# Patient Record
Sex: FEMALE | Race: WHITE | NOT HISPANIC OR LATINO | Employment: FULL TIME | URBAN - METROPOLITAN AREA
[De-identification: names, ages, dates, MRNs, and addresses within clinical notes are randomized per-mention and may not be internally consistent; named-entity substitution may affect disease eponyms.]

---

## 2023-07-14 PROBLEM — H90.6 MIXED HEARING LOSS, BILATERAL: Status: ACTIVE | Noted: 2023-07-14

## 2023-07-14 PROBLEM — J01.41 ACUTE RECURRENT PANSINUSITIS: Status: ACTIVE | Noted: 2023-07-14

## 2023-07-14 PROBLEM — I10 HYPERTENSION: Status: ACTIVE | Noted: 2023-07-14

## 2023-07-14 PROBLEM — J45.909 ASTHMA (HHS-HCC): Status: ACTIVE | Noted: 2022-10-10

## 2023-07-14 PROBLEM — J34.3 HYPERTROPHY OF INFERIOR NASAL TURBINATE: Status: ACTIVE | Noted: 2023-07-14

## 2023-07-14 PROBLEM — H92.02 LEFT EAR PAIN: Status: ACTIVE | Noted: 2023-07-14

## 2023-07-14 PROBLEM — R79.89 ABNORMAL TSH: Status: ACTIVE | Noted: 2023-07-14

## 2023-07-14 PROBLEM — R30.0 DYSURIA: Status: ACTIVE | Noted: 2023-07-14

## 2023-07-14 PROBLEM — D72.819 LEUKOPENIA: Status: ACTIVE | Noted: 2023-07-14

## 2023-07-14 PROBLEM — M19.032 LOCALIZED PRIMARY OSTEOARTHRITIS OF CARPOMETACARPAL (CMC) JOINT OF LEFT WRIST: Status: ACTIVE | Noted: 2023-07-14

## 2023-07-14 PROBLEM — M25.562 LEFT KNEE PAIN: Status: ACTIVE | Noted: 2018-06-20

## 2023-07-14 PROBLEM — N39.0 ACUTE LOWER UTI: Status: ACTIVE | Noted: 2023-07-14

## 2023-07-14 PROBLEM — M25.532 LEFT WRIST PAIN: Status: ACTIVE | Noted: 2023-07-14

## 2023-07-14 PROBLEM — H93.293 ABNORMAL AUDITORY PERCEPTION OF BOTH EARS: Status: ACTIVE | Noted: 2023-07-14

## 2023-07-14 RX ORDER — ACETAMINOPHEN 500 MG
TABLET ORAL
COMMUNITY
Start: 2022-10-28

## 2023-07-14 RX ORDER — VENLAFAXINE HYDROCHLORIDE 225 MG/1
1 TABLET, EXTENDED RELEASE ORAL DAILY
COMMUNITY
Start: 2019-09-24 | End: 2023-07-17 | Stop reason: SDUPTHER

## 2023-07-14 RX ORDER — METHOTREXATE 2.5 MG/1
20 TABLET ORAL WEEKLY
COMMUNITY
Start: 2023-06-01

## 2023-07-14 RX ORDER — FOLIC ACID 1 MG/1
1 TABLET ORAL
COMMUNITY
Start: 2023-06-01

## 2023-07-14 RX ORDER — BUDESONIDE, MICRONIZED 100 %
POWDER (GRAM) MISCELLANEOUS
COMMUNITY
Start: 2022-09-07 | End: 2023-07-17 | Stop reason: ALTCHOICE

## 2023-07-14 RX ORDER — SALINE NASAL SPRAY 1.5 OZ
SOLUTION NASAL
COMMUNITY
Start: 2022-08-30 | End: 2023-07-17 | Stop reason: ALTCHOICE

## 2023-07-14 RX ORDER — VENLAFAXINE 100 MG/1
225 TABLET ORAL
COMMUNITY
End: 2023-07-17 | Stop reason: ALTCHOICE

## 2023-07-14 RX ORDER — FLUTICASONE FUROATE 27.5 UG/1
SPRAY, METERED NASAL
COMMUNITY
Start: 2022-02-16 | End: 2023-10-30 | Stop reason: ALTCHOICE

## 2023-07-14 RX ORDER — VERAPAMIL HYDROCHLORIDE 180 MG/1
180 TABLET, FILM COATED, EXTENDED RELEASE ORAL
COMMUNITY
End: 2023-07-17 | Stop reason: SDUPTHER

## 2023-07-14 RX ORDER — POLYETHYLENE GLYCOL 3350 17 G/17G
POWDER, FOR SOLUTION ORAL
COMMUNITY
Start: 2022-05-05 | End: 2023-07-17 | Stop reason: ALTCHOICE

## 2023-07-14 RX ORDER — VERAPAMIL HYDROCHLORIDE 40 MG/1
40 TABLET ORAL
COMMUNITY
End: 2023-07-17 | Stop reason: WASHOUT

## 2023-07-14 RX ORDER — OXYMETAZOLINE HCL 0.05 %
SPRAY, NON-AEROSOL (ML) NASAL
COMMUNITY
Start: 2022-08-30 | End: 2023-07-17 | Stop reason: ALTCHOICE

## 2023-07-14 RX ORDER — ALENDRONATE SODIUM 70 MG/1
TABLET ORAL
COMMUNITY
Start: 2021-09-29

## 2023-07-14 RX ORDER — ESTRADIOL 0.1 MG/G
CREAM VAGINAL
COMMUNITY
Start: 2022-02-24 | End: 2023-07-17 | Stop reason: ALTCHOICE

## 2023-07-14 RX ORDER — LISINOPRIL 30 MG/1
30 TABLET ORAL
COMMUNITY
End: 2023-07-17 | Stop reason: SDUPTHER

## 2023-07-14 RX ORDER — AZELASTINE 1 MG/ML
SPRAY, METERED NASAL 2 TIMES DAILY
COMMUNITY
Start: 2022-03-31 | End: 2023-07-17 | Stop reason: ALTCHOICE

## 2023-07-14 RX ORDER — FLUTICASONE PROPIONATE 93 UG/1
SPRAY, METERED NASAL
COMMUNITY
Start: 2022-05-05 | End: 2023-07-17 | Stop reason: ALTCHOICE

## 2023-07-14 RX ORDER — SENNOSIDES 8.6 MG/1
TABLET ORAL
COMMUNITY
Start: 2022-08-30 | End: 2023-07-17 | Stop reason: ALTCHOICE

## 2023-07-14 RX ORDER — ACETAMINOPHEN 500 MG
2 TABLET ORAL EVERY 8 HOURS
COMMUNITY
Start: 2022-08-30

## 2023-07-14 RX ORDER — NAPROXEN 500 MG/1
1 TABLET ORAL
COMMUNITY
Start: 2022-05-06 | End: 2023-07-17 | Stop reason: ALTCHOICE

## 2023-07-17 ENCOUNTER — OFFICE VISIT (OUTPATIENT)
Dept: PRIMARY CARE | Facility: CLINIC | Age: 63
End: 2023-07-17
Payer: COMMERCIAL

## 2023-07-17 VITALS
HEART RATE: 73 BPM | HEIGHT: 60 IN | OXYGEN SATURATION: 96 % | BODY MASS INDEX: 29.53 KG/M2 | DIASTOLIC BLOOD PRESSURE: 80 MMHG | WEIGHT: 150.4 LBS | SYSTOLIC BLOOD PRESSURE: 144 MMHG

## 2023-07-17 DIAGNOSIS — J31.0 CHRONIC RHINITIS: ICD-10-CM

## 2023-07-17 DIAGNOSIS — R79.89 ABNORMAL TSH: ICD-10-CM

## 2023-07-17 DIAGNOSIS — M06.9 RHEUMATOID ARTHRITIS, INVOLVING UNSPECIFIED SITE, UNSPECIFIED WHETHER RHEUMATOID FACTOR PRESENT (MULTI): ICD-10-CM

## 2023-07-17 DIAGNOSIS — Z12.31 ENCOUNTER FOR SCREENING MAMMOGRAM FOR MALIGNANT NEOPLASM OF BREAST: ICD-10-CM

## 2023-07-17 DIAGNOSIS — Z13.220 SCREENING, LIPID: ICD-10-CM

## 2023-07-17 DIAGNOSIS — R73.03 PREDIABETES: ICD-10-CM

## 2023-07-17 DIAGNOSIS — E55.9 VITAMIN D DEFICIENCY: ICD-10-CM

## 2023-07-17 DIAGNOSIS — I10 HYPERTENSION, UNSPECIFIED TYPE: ICD-10-CM

## 2023-07-17 DIAGNOSIS — J45.909 ASTHMA, UNSPECIFIED ASTHMA SEVERITY, UNSPECIFIED WHETHER COMPLICATED, UNSPECIFIED WHETHER PERSISTENT (HHS-HCC): ICD-10-CM

## 2023-07-17 DIAGNOSIS — F32.A DEPRESSION, UNSPECIFIED DEPRESSION TYPE: ICD-10-CM

## 2023-07-17 DIAGNOSIS — Z00.00 WELLNESS EXAMINATION: Primary | ICD-10-CM

## 2023-07-17 DIAGNOSIS — M85.80 OSTEOPENIA, UNSPECIFIED LOCATION: ICD-10-CM

## 2023-07-17 PROBLEM — E66.811 CLASS 1 OBESITY: Status: ACTIVE | Noted: 2023-07-17

## 2023-07-17 PROBLEM — J01.90 ACUTE SINUSITIS: Status: ACTIVE | Noted: 2022-08-30

## 2023-07-17 PROBLEM — R82.90 FOUL SMELLING URINE: Status: ACTIVE | Noted: 2023-07-17

## 2023-07-17 PROBLEM — E66.9 CLASS 1 OBESITY: Status: ACTIVE | Noted: 2023-07-17

## 2023-07-17 PROBLEM — L82.1 SEBORRHEIC KERATOSIS: Status: ACTIVE | Noted: 2023-07-17

## 2023-07-17 PROBLEM — R19.7 DIARRHEA: Status: ACTIVE | Noted: 2023-07-17

## 2023-07-17 PROBLEM — E66.3 OVERWEIGHT WITH BODY MASS INDEX (BMI) 25.0-29.9: Status: ACTIVE | Noted: 2023-07-17

## 2023-07-17 PROBLEM — M25.50 ARTHRALGIA OF MULTIPLE JOINTS: Status: ACTIVE | Noted: 2023-07-17

## 2023-07-17 PROBLEM — E78.5 HYPERLIPIDEMIA: Status: ACTIVE | Noted: 2022-06-15

## 2023-07-17 PROBLEM — M77.8 TENDINITIS OF HAND: Status: ACTIVE | Noted: 2023-07-17

## 2023-07-17 PROBLEM — H61.20 IMPACTED CERUMEN: Status: ACTIVE | Noted: 2023-07-17

## 2023-07-17 PROBLEM — N39.0 URINARY TRACT INFECTION: Status: ACTIVE | Noted: 2023-07-17

## 2023-07-17 PROBLEM — M15.4 EROSIVE OSTEOARTHRITIS: Status: ACTIVE | Noted: 2022-05-09

## 2023-07-17 PROBLEM — L23.7 CONTACT DERMATITIS DUE TO POISON IVY: Status: ACTIVE | Noted: 2022-06-15

## 2023-07-17 PROBLEM — J34.89 NASAL CRUSTING: Status: ACTIVE | Noted: 2023-07-17

## 2023-07-17 PROBLEM — H60.509 ACUTE OTITIS EXTERNA: Status: ACTIVE | Noted: 2023-07-17

## 2023-07-17 PROBLEM — R53.83 FATIGUE: Status: ACTIVE | Noted: 2023-07-17

## 2023-07-17 PROBLEM — R10.9 FLANK PAIN: Status: ACTIVE | Noted: 2023-07-17

## 2023-07-17 PROBLEM — H74.01 TYMPANOSCLEROSIS OF RIGHT EAR: Status: ACTIVE | Noted: 2023-07-17

## 2023-07-17 PROBLEM — J34.89 NASAL DISCHARGE: Status: ACTIVE | Noted: 2023-07-17

## 2023-07-17 PROBLEM — J34.2 DEVIATED NASAL SEPTUM: Status: ACTIVE | Noted: 2022-08-30

## 2023-07-17 PROCEDURE — 93000 ELECTROCARDIOGRAM COMPLETE: CPT | Performed by: NURSE PRACTITIONER

## 2023-07-17 PROCEDURE — 99396 PREV VISIT EST AGE 40-64: CPT | Performed by: NURSE PRACTITIONER

## 2023-07-17 PROCEDURE — 3079F DIAST BP 80-89 MM HG: CPT | Performed by: NURSE PRACTITIONER

## 2023-07-17 PROCEDURE — 3077F SYST BP >= 140 MM HG: CPT | Performed by: NURSE PRACTITIONER

## 2023-07-17 PROCEDURE — 1036F TOBACCO NON-USER: CPT | Performed by: NURSE PRACTITIONER

## 2023-07-17 RX ORDER — LISINOPRIL 30 MG/1
30 TABLET ORAL
Qty: 90 TABLET | Refills: 1 | Status: SHIPPED | OUTPATIENT
Start: 2023-07-17

## 2023-07-17 RX ORDER — NEOMYCIN SULFATE, POLYMYXIN B SULFATE AND HYDROCORTISONE 10; 3.5; 1 MG/ML; MG/ML; [USP'U]/ML
SUSPENSION/ DROPS AURICULAR (OTIC)
COMMUNITY
Start: 2023-06-11 | End: 2023-07-17 | Stop reason: ALTCHOICE

## 2023-07-17 RX ORDER — VENLAFAXINE HYDROCHLORIDE 225 MG/1
225 TABLET, EXTENDED RELEASE ORAL DAILY
Qty: 90 TABLET | Refills: 1 | Status: SHIPPED | OUTPATIENT
Start: 2023-07-17

## 2023-07-17 RX ORDER — VERAPAMIL HYDROCHLORIDE 180 MG/1
180 TABLET, FILM COATED, EXTENDED RELEASE ORAL
Qty: 90 TABLET | Refills: 1 | Status: SHIPPED | OUTPATIENT
Start: 2023-07-17

## 2023-07-17 ASSESSMENT — ENCOUNTER SYMPTOMS
RESPIRATORY NEGATIVE: 1
NEUROLOGICAL NEGATIVE: 1
GASTROINTESTINAL NEGATIVE: 1
CONSTITUTIONAL NEGATIVE: 1
CARDIOVASCULAR NEGATIVE: 1
PSYCHIATRIC NEGATIVE: 1

## 2023-07-17 NOTE — PATIENT INSTRUCTIONS
Labs are fasting 10-12 hours, do drink water, complete prior to next appt  Mammogram has been ordered, please schedule to complete  Monitor BP at home and let me know if continues to in the 130's/ 90's

## 2023-07-17 NOTE — PROGRESS NOTES
Subjective   Patient ID: Margarito Dumont is a 62 y.o. female who presents for Annual Exam (Yearly well exam ).    HPI   Patient here for yearly wellness exam, last seen on 10/17/2022  25,000 steps a day as a .  Current concerns: None   Chronic concerns: Depression,  HTN, Hyperlipidemia, RA, Prediabetes, Vitamin D deficiency, Chronic rhinitis, Depression & anxiety, asthma, osteopenia   Specialist   - Dermatologist  - Allergist  - Otolaryngology  - Rheumatologists  - Urogyn - Dr. Vignesh Flynn rheumatology 06/2023    Review of Systems   Constitutional: Negative.    HENT: Negative.     Respiratory: Negative.     Cardiovascular: Negative.    Gastrointestinal: Negative.    Genitourinary: Negative.    Musculoskeletal:         As noted in HPI   Skin: Negative.    Neurological: Negative.    Psychiatric/Behavioral: Negative.         Objective   /80 (BP Location: Left arm, Patient Position: Sitting, BP Cuff Size: Adult)   Pulse 73   Ht 1.524 m (5')   Wt 68.2 kg (150 lb 6.4 oz)   SpO2 96%   BMI 29.37 kg/m²     Physical Exam  Vitals reviewed.   Constitutional:       Appearance: Normal appearance.   HENT:      Right Ear: Tympanic membrane, ear canal and external ear normal.      Left Ear: Tympanic membrane, ear canal and external ear normal.      Nose: Nose normal.      Mouth/Throat:      Mouth: Mucous membranes are moist.      Pharynx: Oropharynx is clear.   Eyes:      General: Lids are normal.      Conjunctiva/sclera: Conjunctivae normal.   Neck:      Thyroid: No thyromegaly.      Vascular: No carotid bruit.   Cardiovascular:      Rate and Rhythm: Normal rate and regular rhythm.      Heart sounds: Normal heart sounds.   Pulmonary:      Effort: Pulmonary effort is normal.      Breath sounds: Normal breath sounds.   Abdominal:      General: Bowel sounds are normal.      Palpations: Abdomen is soft.   Musculoskeletal:         General: Normal range of motion.      Cervical back: Normal range of motion and neck  supple.   Lymphadenopathy:      Cervical: No cervical adenopathy.   Skin:     General: Skin is warm.   Neurological:      General: No focal deficit present.      Mental Status: She is alert.   Psychiatric:         Mood and Affect: Mood normal.         Behavior: Behavior normal.         Judgment: Judgment normal.       Assessment/Plan   Health Maintenance  Labs- latest through rheumatology   Influenza- declined   Prevnar 13/20- not indicated   Shingrix- recommended previously has yet to receive.   Colonoscopy 06/2022- follow up 5 years  Cervical Cancer screen - Hysterectomy   Mammogram- 04/2022- order placed to update screening.   DEXA BONE Density through Rheumatology   Diagnoses and all orders for this visit:    Wellness examination  -     CBC and Auto Differential; Future  -     Comprehensive Metabolic Panel; Future  Hypertension, unspecified type- Bp elevated today on repeat measurement  132/92,   Continue to monitor at home and call back to office if remains elevated 130/90.   -     lisinopril 30 mg tablet; Take 1 tablet (30 mg) by mouth once daily.  -     verapamil SR (Calan SR) 180 mg ER tablet; Take 1 tablet (180 mg) by mouth once daily.  -     ECG 12 lead (Clinic Performed)- Normal EKG.   -     CBC and Auto Differential; Future  -     Comprehensive Metabolic Panel; Future  -     Albumin , Urine Random; Future  Asthma, unspecified asthma severity, unspecified whether complicated, unspecified whether persistent  Depression, unspecified depression type  -     venlafaxine 225 mg 24 hr tablet; Take 1 tablet (225 mg) by mouth once daily. (Refilled 90 +1)  Osteopenia, unspecified location  -     Vitamin D 1,25 Dihydroxy; Future (over the counter)   Rheumatoid arthritis, involving unspecified site, unspecified whether rheumatoid factor present (CMS/[facility])-   Medications managed by rheumatologist  - Alendronate - Folic acid- Methotrexate  Abnormal TSH  -     TSH with reflex to Free T4 if abnormal;  Future  Prediabetes  -     Hemoglobin A1C; Future  Vitamin D deficiency  -     Vitamin D 1,25 Dihydroxy; Future  Chronic rhinitis- Sees ENT  Encounter for screening mammogram for malignant neoplasm of breast  -     BI mammo bilateral screening tomosynthesis; Future  Screening, lipid  -    Lipid Panel; Future  Plan- Six months   Labs, complete prior to next appt  Mammogram order-> call with result  Monitor BP at home and let me know if continues to in the 130's/ 90's

## 2023-08-19 PROBLEM — J32.4 CHRONIC PANSINUSITIS: Status: ACTIVE | Noted: 2023-08-19

## 2023-08-19 PROBLEM — M77.8 TENDINITIS OF HAND: Status: RESOLVED | Noted: 2023-07-17 | Resolved: 2023-08-19

## 2023-08-19 PROBLEM — E78.00 ELEVATED LDL CHOLESTEROL LEVEL: Status: ACTIVE | Noted: 2023-08-19

## 2023-08-19 PROBLEM — E78.00 ELEVATED LDL CHOLESTEROL LEVEL: Status: RESOLVED | Noted: 2023-08-19 | Resolved: 2023-08-19

## 2023-08-19 RX ORDER — FLUTICASONE PROPIONATE 50 MCG
2 SPRAY, SUSPENSION (ML) NASAL
COMMUNITY
Start: 2009-11-05

## 2023-08-19 RX ORDER — AMOXICILLIN AND CLAVULANATE POTASSIUM 875; 125 MG/1; MG/1
TABLET, FILM COATED ORAL
COMMUNITY
Start: 2009-11-16 | End: 2023-10-30 | Stop reason: ALTCHOICE

## 2023-10-26 NOTE — PROGRESS NOTES
Subjective   Patient ID: Margarito Dumont is a 63 y.o. female who presents for No chief complaint on file..  HPI  This 63-year-old female is being seen in the office today for recheck of her head and neck and ears especially.  She does have a history of mixed hearing loss with a significantly sclerotic eardrum on the right.  She has had problems with barotrauma in the past which was treated medically.  She presents today for recheck of her hearing primarily.  No difficulties with barotrauma or recurrence of middle ear fluid over the ear.  She does sense a difference between the hearing on both ears her right is always been her lower hearing side.    Objective   Physical Exam  GENERAL MUNA.EARANCE: Alert, in no acute distress, normal pitch and clarity of voice, well-developed and nourished, cooperative.     HEAD/FACE: Normocephalic, atraumatic, normal facial movements and strength, no no tenderness to palpation, no lesions noted.     SKIN: Normal turgor, no raised or ulcerative lesions, warm and dry to palpation.     EYES: Extraocular motions intact, no nystagmus noted, pupils equal and reactive to light and accommodation, no conjunctivitis.     EARS:  -external ear on the right was negative for inflammation the canal was slightly narrow at its opening. There was some cerumen debris removed with a wax loop microscopically. The ear canal itself was noninflamed and the surface of the tympanic membrane was noninflamed and intact with some sclerotic changes.  There was diffuse sclerosis of the tympanic membrane noted consistent with her past exams. On the left-hand side there was a patent canal with a monomeric tympanic membrane with dryness noted.  Sclerotic changes around the periphery were noted and around the annulus posteriorly there did not appear to be any evidence for middle ear inflammation on the left.     NOSE: No external skin lesions are noted, nares are patent, septum is intact, no evidence for purulent  discharge was noted by anterior rhinoscopy. She status post turbinate reduction. Sinuses are nontender to palpation bilaterally, no intranasal lesions or inflammation is noted, nasal valve is normal.     OROPHARYNX/ORAL CAVITY: Oropharynx is not inflamed and is without lesions, mucosa of the oral cavity is intact and without lesions, tongue is midline and mobile, upper plate, no mucous membrane pathology was noted on the palate or oropharynx, TMJ was mobile     NECK: No lymphadenopathy is palpated, carotid pulses are intact, neck is supple with full range of motion, no thyroid abnormalities are noted, trachea is midline, no neck masses are palpated.        NEUROLOGIC/PSYCH; alert and oriented, cranial nerves are grossly intact, gait is without falling, no motor deficits are noted    Her audiogram today on the right ear continues to show a mild low-frequency hearing loss mild conductive loss at 2000 3000 Hz moderate loss in the remaining upper frequencies of sound.  Left ear relatively the same as the right ear up through 1500 Hz continues to be low normal to mild hearing loss up to 4000 Hz then a mild to moderate high-frequency hearing loss is noted the asymmetry between the 2 ears in the high frequencies remains unchanged compared to 1 year ago.  She continues to have bilateral tinnitus which has not changed.  Tympanograms do not reflect adequate movement on the right due to the sclerosis and thickness of the TM.  There is a monomeric type a pattern on the left.    Patient ID: Margarito Dumont is a 63 y.o. female.    Ear cerumen removal    Date/Time: 10/30/2023 9:24 AM    Performed by: Ortiz Moore DMD, MD  Authorized by: Ortiz Moore DMD, MD    Consent:     Consent obtained:  Verbal    Consent given by:  Patient    Risks discussed:  Pain and incomplete removal    Alternatives discussed:  No treatment and alternative treatment  Universal protocol:     Procedure explained and questions answered to patient  or proxy's satisfaction: yes      Imaging studies available: no      Required blood products, implants, devices, and special equipment available: no      Patient identity confirmed:  Verbally with patient  Procedure details:     Location:  L ear and R ear    Procedure type: curette      Procedure type comment:  Or suction    Procedure outcomes: cerumen removed    Post-procedure details:     Inspection:  No bleeding and ear canal clear    Hearing quality:  Improved    Procedure completion:  Tolerated well, no immediate complications  Comments:      CERUMEN REMOVAL    Consent:   the planned procedure is discussed including possible risks, benefits and alternative treatments reviewed. Verbal consent is obtained.    Indications:  obstructive cerumen is noted affecting hearing and causing discomfort.    Procedure: The ears are examined microscopically and obstructive cerumen is removed with a wax loop, suction, and forceps.    Findings: Cerumen and epithelial debris obstructs mainly the right ear canal tympanic membranes are intact with significant sclerotic changes on the right and areas of monomeric membrane on the left with some peripheral sclerosis.  And noninflamed once visualized and no infections are noted.    Post procedure: The patient tolerated the procedure well without complications.    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  Her exam is unchanged and she has no subjective complaints regarding her hearing.  The asymmetry between the 2 years is unchanged over this year.  She is having no symptoms of vertigo or change in her tinnitus.  I talked about MRI scanning which would be done if there is an increase in the asymmetry but because of her  background and the changes on the tympanic membrane it did not appear that that would be needed at this point.  I did remind her to avoid loud noise without ear protection, always contact the office if she senses any change during the year especially after respiratory infections.  If she has any new symptoms such as vertigo she should contact the office and be evaluated.  I answered her questions in detail we will see her back again in 1 year.

## 2023-10-30 ENCOUNTER — OFFICE VISIT (OUTPATIENT)
Dept: OTOLARYNGOLOGY | Facility: CLINIC | Age: 63
End: 2023-10-30
Payer: COMMERCIAL

## 2023-10-30 ENCOUNTER — CLINICAL SUPPORT (OUTPATIENT)
Dept: AUDIOLOGY | Facility: CLINIC | Age: 63
End: 2023-10-30
Payer: COMMERCIAL

## 2023-10-30 VITALS
BODY MASS INDEX: 26.9 KG/M2 | HEART RATE: 73 BPM | DIASTOLIC BLOOD PRESSURE: 69 MMHG | WEIGHT: 137 LBS | HEIGHT: 60 IN | SYSTOLIC BLOOD PRESSURE: 106 MMHG

## 2023-10-30 DIAGNOSIS — J34.2 DEVIATED NASAL SEPTUM: ICD-10-CM

## 2023-10-30 DIAGNOSIS — H61.21 IMPACTED CERUMEN OF RIGHT EAR: ICD-10-CM

## 2023-10-30 DIAGNOSIS — J34.89 NASAL DISCHARGE: ICD-10-CM

## 2023-10-30 DIAGNOSIS — H69.91 DYSFUNCTION OF RIGHT EUSTACHIAN TUBE: ICD-10-CM

## 2023-10-30 DIAGNOSIS — H90.6 MIXED HEARING LOSS, BILATERAL: Primary | ICD-10-CM

## 2023-10-30 DIAGNOSIS — H74.01 TYMPANOSCLEROSIS OF RIGHT EAR: ICD-10-CM

## 2023-10-30 DIAGNOSIS — H90.A31 MIXED CONDUCTIVE AND SENSORINEURAL HEARING LOSS OF RIGHT EAR WITH RESTRICTED HEARING OF LEFT EAR: Primary | ICD-10-CM

## 2023-10-30 PROBLEM — H93.299 ABNORMAL AUDITORY PERCEPTION: Status: ACTIVE | Noted: 2023-07-14

## 2023-10-30 PROCEDURE — 3074F SYST BP LT 130 MM HG: CPT | Performed by: OTOLARYNGOLOGY

## 2023-10-30 PROCEDURE — 1159F MED LIST DOCD IN RCRD: CPT | Performed by: OTOLARYNGOLOGY

## 2023-10-30 PROCEDURE — 1125F AMNT PAIN NOTED PAIN PRSNT: CPT | Performed by: OTOLARYNGOLOGY

## 2023-10-30 PROCEDURE — 92557 COMPREHENSIVE HEARING TEST: CPT | Performed by: AUDIOLOGIST

## 2023-10-30 PROCEDURE — 1036F TOBACCO NON-USER: CPT | Performed by: OTOLARYNGOLOGY

## 2023-10-30 PROCEDURE — 1160F RVW MEDS BY RX/DR IN RCRD: CPT | Performed by: OTOLARYNGOLOGY

## 2023-10-30 PROCEDURE — 92567 TYMPANOMETRY: CPT | Performed by: AUDIOLOGIST

## 2023-10-30 PROCEDURE — 99214 OFFICE O/P EST MOD 30 MIN: CPT | Performed by: OTOLARYNGOLOGY

## 2023-10-30 PROCEDURE — 69210 REMOVE IMPACTED EAR WAX UNI: CPT | Performed by: OTOLARYNGOLOGY

## 2023-10-30 PROCEDURE — 3078F DIAST BP <80 MM HG: CPT | Performed by: OTOLARYNGOLOGY

## 2023-10-30 RX ORDER — IBUPROFEN 800 MG/1
TABLET ORAL
COMMUNITY
Start: 2023-08-03

## 2023-10-30 RX ORDER — TRIAZOLAM 0.25 MG/1
TABLET ORAL
COMMUNITY
Start: 2023-08-03 | End: 2023-10-30 | Stop reason: ALTCHOICE

## 2023-10-30 NOTE — PROGRESS NOTES
COMPREHENSIVE AUDIOMETRIC EVALUATION      Name:  Margarito Dumont  :  1960  Age:  63 y.o.  Date of Evaluation:  10/30/23   Referring Provider:  Dr. Moore     History:  Ms. Dumont was seen today for an evaluation of hearing.  Please recall, patient has a known hearing loss, greater in the right than the left last documented 10/28/22. Patient reported tinnitus, bilaterally.    OTOSCOPY (following cerumen removal by Dr. Moore):       Right Ear: Clear canal       Left Ear: Clear canal    226 Hz TYMPANOMETRY:       Right Ear: Type B: Flat with normal ear canal volume, consistent with middle ear involvement       Left Ear: Type Ad: hypercompliant with normal peak pressure and ear canal volume    AUDIOMETRIC EVALUATION (Inserts):       Right Ear: Mild rising to borderline Normal to Mild sloping to Moderately severe Mixed hearing loss                 Left Ear: Mild rising to borderline Normal to Mild sloping to Moderate Sensorineural hearing loss         NOTE: Hearing sensitivity essentially consistent with last audiometric evaluation; HF asymmetry noted, however this was also previously documented.    Test technique:  Standard Audiometry  Reliability:   good    SPEECH RECOGNITION THRESHOLD:       Right Ear:  20 dBHL in good with PTA       Left Ear:  20 dBHL in good with PTA    WORD RECOGNITION:       Right Ear:  excellent (100%) at elevated presentation level       Left Ear:  excellent (100%) at elevated presentation level    RECOMMENDATIONS:  -Recommend patient return following any medical management for repeated audiometric evaluation and evaluation of efficacy of management on hearing sensitivity and tympanometry  -Recommend further investigation by ENT provider into clinically significant high frequency asymmetric hearing sensitivity to rule out retrocochlear involvement has this not already been performed  -Recommend patient return in approximately a year for monitoring of asymmetric hearing  cody.    Yoseph Blanca, CCC-A     Appt: 9:00 - 9:30 AM

## 2023-12-21 ENCOUNTER — ANCILLARY PROCEDURE (OUTPATIENT)
Dept: RADIOLOGY | Facility: CLINIC | Age: 63
End: 2023-12-21
Payer: COMMERCIAL

## 2023-12-21 ENCOUNTER — OFFICE VISIT (OUTPATIENT)
Dept: ORTHOPEDIC SURGERY | Facility: CLINIC | Age: 63
End: 2023-12-21
Payer: COMMERCIAL

## 2023-12-21 VITALS — WEIGHT: 140 LBS | HEIGHT: 60 IN | BODY MASS INDEX: 27.48 KG/M2

## 2023-12-21 DIAGNOSIS — M79.671 FOOT PAIN, RIGHT: ICD-10-CM

## 2023-12-21 DIAGNOSIS — M06.9 RHEUMATOID ARTHRITIS, INVOLVING UNSPECIFIED SITE, UNSPECIFIED WHETHER RHEUMATOID FACTOR PRESENT (MULTI): Primary | ICD-10-CM

## 2023-12-21 PROCEDURE — 1036F TOBACCO NON-USER: CPT | Performed by: SPECIALIST

## 2023-12-21 PROCEDURE — 1159F MED LIST DOCD IN RCRD: CPT | Performed by: SPECIALIST

## 2023-12-21 PROCEDURE — L4361 PNEUMA/VAC WALK BOOT PRE OTS: HCPCS | Performed by: SPECIALIST

## 2023-12-21 PROCEDURE — 1160F RVW MEDS BY RX/DR IN RCRD: CPT | Performed by: SPECIALIST

## 2023-12-21 PROCEDURE — 73630 X-RAY EXAM OF FOOT: CPT | Mod: RIGHT SIDE | Performed by: RADIOLOGY

## 2023-12-21 PROCEDURE — 99204 OFFICE O/P NEW MOD 45 MIN: CPT | Performed by: SPECIALIST

## 2023-12-21 PROCEDURE — 1125F AMNT PAIN NOTED PAIN PRSNT: CPT | Performed by: SPECIALIST

## 2023-12-21 PROCEDURE — 73630 X-RAY EXAM OF FOOT: CPT | Mod: RT

## 2023-12-21 RX ORDER — NAPROXEN 375 MG/1
375 TABLET ORAL
Qty: 60 TABLET | Refills: 0 | Status: SHIPPED | OUTPATIENT
Start: 2023-12-21 | End: 2024-12-20

## 2023-12-21 NOTE — PROGRESS NOTES
Contact him right foot pain since September - fell down the steps.  Thought it was getting better but is having a lot of pain at night still. Some tingling in the medial side of her foot.     At the time it happened, she went to urgent care and was given a boot for a sprain - made it harder to walk and hurt worse.     Taking Tylenol for pain.     Xrays done today.    27 point review of systems negative except what is stated in HPI    On examination of the right  ankle/foot:  Mildly antalgic gait  Neutral alignment  Minimal swelling; no ecchymosis or erythema. Skin intact; no ulcers or lesions.   Full ROM in  ankle plantarflexion, dorsiflexion, inversion and eversion; normal ROM in 2-5th toe flexion/extension and 1st MTP flexion/extension.  Resisted ankle dorsiflexion reproduces giveaway weakness with pain  Tenderness to palpation: Along the course of her anterior tibialis tendon just anterior to the ankle and midfoot  No tenderness to palpation over the Achilles, medial and lateral malleolus, posterior tibial tendon, peroneal tendon,   Neurovascularly intact. Good capillary refill. No sensory deficit to light touch. Normal proprioception. Dorsalis pedis and posterior tibial pulses 2+ bilaterally.        I personally reviewed the patient's x-ray images and reports of the right ankle/foot. The xrays show no acute bone or joint abnormality    ASSESSMENT: Right anterior tibialis tendinitis.  She was placed on Naprosyn with appropriate precautions/approval from primary.  She was placed in a walking cast today removable at rest.  I told her it was critical to get the pain under control as this chronic inflammation of the tendon could lead to its weakness and subsequent rupture.  Will see her back in the office in a month for repeat exam.  She could wean from the boot as soon as pain is controlled.    PLAN: Treatment options were discussed with the patient.  As above    This note was dictated using speech recognition  software and was not corrected for spelling or grammatical errors

## 2024-01-12 DIAGNOSIS — M18.12 ARTHRITIS OF CARPOMETACARPAL (CMC) JOINT OF LEFT THUMB: ICD-10-CM

## 2024-01-15 ENCOUNTER — HOSPITAL ENCOUNTER (OUTPATIENT)
Dept: RADIOLOGY | Facility: HOSPITAL | Age: 64
Discharge: HOME | End: 2024-01-15
Payer: COMMERCIAL

## 2024-01-15 ENCOUNTER — OFFICE VISIT (OUTPATIENT)
Dept: ORTHOPEDIC SURGERY | Facility: CLINIC | Age: 64
End: 2024-01-15
Payer: COMMERCIAL

## 2024-01-15 VITALS — WEIGHT: 140 LBS | HEIGHT: 60 IN | BODY MASS INDEX: 27.48 KG/M2

## 2024-01-15 DIAGNOSIS — M19.032 CMC DJD(CARPOMETACARPAL DEGENERATIVE JOINT DISEASE), LOCALIZED PRIMARY, LEFT: Primary | ICD-10-CM

## 2024-01-15 DIAGNOSIS — M18.12 ARTHRITIS OF CARPOMETACARPAL (CMC) JOINT OF LEFT THUMB: ICD-10-CM

## 2024-01-15 PROCEDURE — 73110 X-RAY EXAM OF WRIST: CPT | Mod: LEFT SIDE | Performed by: RADIOLOGY

## 2024-01-15 PROCEDURE — 73110 X-RAY EXAM OF WRIST: CPT | Mod: LT

## 2024-01-15 PROCEDURE — 1125F AMNT PAIN NOTED PAIN PRSNT: CPT | Performed by: ORTHOPAEDIC SURGERY

## 2024-01-15 PROCEDURE — 1036F TOBACCO NON-USER: CPT | Performed by: ORTHOPAEDIC SURGERY

## 2024-01-15 PROCEDURE — 99214 OFFICE O/P EST MOD 30 MIN: CPT | Performed by: ORTHOPAEDIC SURGERY

## 2024-01-15 PROCEDURE — 20600 DRAIN/INJ JOINT/BURSA W/O US: CPT | Performed by: ORTHOPAEDIC SURGERY

## 2024-01-15 RX ORDER — LIDOCAINE HYDROCHLORIDE 10 MG/ML
1 INJECTION INFILTRATION; PERINEURAL
Status: COMPLETED | OUTPATIENT
Start: 2024-01-15 | End: 2024-01-15

## 2024-01-15 RX ORDER — TRIAMCINOLONE ACETONIDE 40 MG/ML
40 INJECTION, SUSPENSION INTRA-ARTICULAR; INTRAMUSCULAR
Status: COMPLETED | OUTPATIENT
Start: 2024-01-15 | End: 2024-01-15

## 2024-01-15 RX ADMIN — LIDOCAINE HYDROCHLORIDE 1 ML: 10 INJECTION INFILTRATION; PERINEURAL at 12:57

## 2024-01-15 RX ADMIN — TRIAMCINOLONE ACETONIDE 40 MG: 40 INJECTION, SUSPENSION INTRA-ARTICULAR; INTRAMUSCULAR at 12:57

## 2024-01-15 NOTE — PROGRESS NOTES
ALFREDA DE LAR OSA is a 62 year female who follows up today with left base of thumb pain. Patient reports symptoms for months, getting worse. Patient reports no numbness or tingling. Reports no past surgeries or trauma to the area. Pain worse with use, better with rest. Pain dull ache, sharp at times. Mass comes and goes in size. Had injection about 1 year ago that helped, would like another.     Review of Systems    Constitutional: see HPI, no fever, no chills, not feeling tired, no significant weight gain or weight loss.   Eyes: No vision changes  ENT: no nosebleeds.   Cardiovascular: no chest pain.   Respiratory: no shortness of breath and no cough.   Gastrointestinal: no abdominal pain, no nausea, no vomiting and no diarrhea.   Musculoskeletal: per HPI  Neurological: no headache, no gait disturbances  Psychiatric: no depression and no sleep disturbances.   Endocrine: no muscle weakness and no muscle cramps.   Hematologic/Lymphatic: no swollen glands and no tendency for easy bruising or excessive swelling.     Patient's past medical history, past surgical history, allergies, and medications have been reviewed unless otherwise noted in the chart.     CMC Arthritis Exam  Inspection:  no evidence of infection, no edema, no erythema, no ecchymosis, Palpation:  compartments are soft, pain with palpation over the Thumb CMC joint, Range of Motion:  full wrist and finger range of motion, Stability:  no wrist or finger instability detected, Strength:  5/5  and pinch strength, Skin:  intact, Vascular:  capillary refill <2 seconds distally, Sensation:  intact to light touch distally, Tests:  negative Finkelstein's , positive Thumb CMC Grind.       Mass  Location:  Dorsal CMC Specific:  Ganglion Inspection (mass size):  1 cm x 1 cm Palpation:  soft, mobile, compressible, Range of Motion:  full wrist and finger motion, Stability:  no instability noted, Strength:  5/5 hand and wrist, Skin:  intact, Vascular:  capillary refill  <2 seconds distally, Sensation:  sensation intact to light touch distally.       Constitutional   General appearance: Alert and in no acute distress. Well developed, well nourished.    Eyes   External Eye, Conjunctiva and lids: Normal external exam - pupils were equal in size, round, reactive to light (PERRL) with normal accommodation and extraocular movements intact (EOMI).   Ears, Nose, Mouth, and Throat   Hearing: Normal.   Neck   Neck: No neck mass was observed. Supple.   Pulmonary   Respiratory effort: No respiratory distress.   Cardiovascular   Examination of extremities: No peripheral edema.   Psychiatric   Judgment and insight: Intact.   Orientation to person, place, and time: Alert and oriented x 3.       Mood and affect: Normal.      XR - mod CMC DJD    Left thumb CMC DJD  Based on the history, physical exam and imaging studies above, the patient's presentation is consistent with the above diagnosis.  I had a long discussion with the patient regarding their presentation and the treatment options.  We discussed initial nonoperative versus operative management options as well as potential further diagnostic imaging.  We again discussed her treatment options going forward along with their associated risks and benefits. After thorough discussion, the patient has elected to proceed with conservative management. All questions were answered to the patients satisfaction who seems satisfied with the plan.  They will call the office with any questions/concerns.    Discussion I discussed the diagnosis and treatment options with the patient today along with their associated risks and benefits. After thorough discussion, the patient has elected to proceed with a corticosteroid injection with Kenalog and Xylocaine, which was performed in the office today under aseptic technique and the patient tolerated the procedure well.          Ortho Injections:           Injection site left thumb CMC. Medication 1 cc 1% Xylocaine,  1 cc 40 mg Kenalog, Injection given under sterile conditions. No immediate complications noted. Post injection instructions given.  Comfortcool  FU 8 weeks prn - No XR    Patient ID: Margarito Dumont is a 63 y.o. female.    S Inj/Asp: L thumb CMC on 1/15/2024 12:57 PM  Indications: pain  Details: 25 G needle (dorsoradial) approach  Medications: 40 mg triamcinolone acetonide 40 mg/mL; 1 mL lidocaine 10 mg/mL (1 %)  Outcome: tolerated well, no immediate complications  Procedure, treatment alternatives, risks and benefits explained, specific risks discussed. Consent was given by the patient. Immediately prior to procedure a time out was called to verify the correct patient, procedure, equipment, support staff and site/side marked as required. Patient was prepped and draped in the usual sterile fashion.

## 2024-01-18 ENCOUNTER — OFFICE VISIT (OUTPATIENT)
Dept: ORTHOPEDIC SURGERY | Facility: CLINIC | Age: 64
End: 2024-01-18
Payer: COMMERCIAL

## 2024-01-18 VITALS — HEIGHT: 60 IN | WEIGHT: 140 LBS | BODY MASS INDEX: 27.48 KG/M2

## 2024-01-18 DIAGNOSIS — M76.811 ANTERIOR TIBIALIS TENDINITIS, RIGHT: ICD-10-CM

## 2024-01-18 PROCEDURE — 1125F AMNT PAIN NOTED PAIN PRSNT: CPT | Performed by: SPECIALIST

## 2024-01-18 PROCEDURE — 1159F MED LIST DOCD IN RCRD: CPT | Performed by: SPECIALIST

## 2024-01-18 PROCEDURE — 1160F RVW MEDS BY RX/DR IN RCRD: CPT | Performed by: SPECIALIST

## 2024-01-18 PROCEDURE — 99213 OFFICE O/P EST LOW 20 MIN: CPT | Performed by: SPECIALIST

## 2024-01-18 PROCEDURE — 1036F TOBACCO NON-USER: CPT | Performed by: SPECIALIST

## 2024-01-18 NOTE — PROGRESS NOTES
Follow up Right anterior tibialis tendinitis.   Has been wearing her boot, feeling a little better.       Exam: Minimal to no pain to palpation of the right anterior tibialis tendon except for its insertion around the medial midfoot.  Resisted eversion with minimal pain only mild strength reduction.  Rest exams unremarkable intact dermis intact neurovascular status negative Homans.    Assessment/plan: Resolving right anterior tibialis tendinitis.  I think she be best served by working with physical therapy to wean from the boot as tolerated well doing range of motion and strengthening and modalities at appropriate pace to prevent recurrence of her tendinitis.  Follow-up as needed

## 2024-02-05 ENCOUNTER — EVALUATION (OUTPATIENT)
Dept: PHYSICAL THERAPY | Facility: HOSPITAL | Age: 64
End: 2024-02-05
Payer: COMMERCIAL

## 2024-02-05 DIAGNOSIS — R26.2 DIFFICULTY IN WALKING: Primary | ICD-10-CM

## 2024-02-05 DIAGNOSIS — M76.811 ANTERIOR TIBIALIS TENDINITIS, RIGHT: ICD-10-CM

## 2024-02-05 PROCEDURE — 97110 THERAPEUTIC EXERCISES: CPT | Mod: GP | Performed by: PHYSICAL THERAPIST

## 2024-02-05 PROCEDURE — 97161 PT EVAL LOW COMPLEX 20 MIN: CPT | Mod: GP | Performed by: PHYSICAL THERAPIST

## 2024-02-05 ASSESSMENT — ENCOUNTER SYMPTOMS
DEPRESSION: 0
OCCASIONAL FEELINGS OF UNSTEADINESS: 1
LOSS OF SENSATION IN FEET: 0

## 2024-02-05 ASSESSMENT — PAIN - FUNCTIONAL ASSESSMENT: PAIN_FUNCTIONAL_ASSESSMENT: 0-10

## 2024-02-05 ASSESSMENT — PAIN SCALES - GENERAL: PAINLEVEL_OUTOF10: 0 - NO PAIN

## 2024-02-05 NOTE — PROGRESS NOTES
Physical Therapy    Physical Therapy Evaluation    Patient Name: Margarito Dumont  MRN: [id number]  Today's Date: 2/5/2024  Time Calculation  Start Time: 1645  Stop Time: 1730  Time Calculation (min): 45 min    Assessment  PT Assessment Results: Decreased range of motion, Decreased strength, Pain  Rehab Prognosis: Good  Evaluation/Treatment Tolerance: Patient tolerated treatment well  Pt. Would benefit from skilled PT to address the above issues.     Plan  Treatment/Interventions: Cryotherapy, Hot pack, Education/ Instruction, Self care/ home management, Therapeutic exercises, Therapeutic activities  PT Plan: Skilled PT  Rehab Potential: Good  Plan of Care Agreement: Patient    Current Problem  1. Difficulty in walking        2. Anterior tibialis tendinitis, right  Referral to Physical Therapy    Follow Up In Physical Therapy          Subjective   9-6-2023 Pt. Fell down the steps and torn ligaments, pt. Has been in walking boot since christmas. Pt. Cont. With pain at night. She is working [employer] work and on her feet all day.     General:  General  Reason for Referral: intitial eval  Referred By: Dr. Gila MD  Preferred Learning Style: verbal  Precautions:  Precautions  STEADI Fall Risk Score (The score of 4 or more indicates an increased risk of falling): 4  Medical Precautions:  (osteoperosis, OA, RA, HTN)     Pain:  Pain Assessment: 0-10  Pain Score: 0 - No pain  Pain Type: Acute pain  Pain Location: Ankle  Pain Orientation: Right  Pain Radiating Towards:  (inside of foot.)  Home Living:  wfl   Prior Function Per Pt/Caregiver Report:  wfl     Objective     Range of Motion:  R ankle :   DF: 0   PF: 60   Inver / Ever: wnl     Strength:  L LE wfl  R hip wnl  R knee 4-4+/5     Palpation:  Joint mobs ankle with mild tightness  Inversion and eversion PROM cause pain  Pain at medial plantar surface.   Special Tests:     Gait:  Pt. Amb. With R walking boot with listing, indep.         Other:  VISIT 1: 2-5-24 : EVAL AND  HEP. Issued heel lift L.    Per script: wean from boot as tolerated; ROM; PRE's; Stretching; modalities    EXERCISES       Date       VISIT# # # # #    REPS REPS REPS REPS          Nustep              Rkbd:       df       Up / down       Inver / ever              Side amb       Back amb              Step ups       Heel raises       sls              shuttle       dlp       slp       hr                                                 Ifc prn                                   HEP                  Outcome Measures:   LEFS: 71    OP EDUCATION:  Access Code: [email or web address]  URL: [email or web address]  Date: 02/05/2024  Prepared by: Lynne Elizabeth    Exercises  - Seated Ankle Plantar Flexion with Resistance Loop  - 1 x daily - 7 x weekly - 3 sets - 10 reps  - Seated Ankle Dorsiflexion with Resistance  - 1 x daily - 7 x weekly - 3 sets - 10 reps  - Seated Ankle Eversion with Resistance  - 1 x daily - 7 x weekly - 3 sets - 10 reps  - Seated Ankle Inversion with Resistance and Legs Crossed  - 1 x daily - 7 x weekly - 3 sets - 10 reps  - Seated Calf Towel Stretch  - 1 x daily - 7 x weekly - 3 sets - 10 reps  Outpatient Education  Individual(s) Educated: Patient  Education Provided: Home Exercise Program, POC  Risk and Benefits Discussed with Patient/Caregiver/Other: yes  Patient/Caregiver Demonstrated Understanding: yes  Plan of Care Discussed and Agreed Upon: yes  Patient Response to Education: Patient/Caregiver Verbalized Understanding of Information    Goals:  Active       Tibialis anterior tendonitis; difficulty ambulating       Pt. will be indep with HeP.        Start:  02/05/24    Expected End:  05/05/24            Pt. will c/o less than 2/10 night pain after a day of work.       Start:  02/05/24    Expected End:  05/05/24            Pt. will increase R ankle AROM to wnl to allow up and down stairs wnl.       Start:  02/05/24    Expected End:  05/05/24            Pt. will increase R LE strength to wnl to  allow RTW wfl.       Start:  02/05/24    Expected End:  05/05/24

## 2024-02-12 ENCOUNTER — TREATMENT (OUTPATIENT)
Dept: PHYSICAL THERAPY | Facility: HOSPITAL | Age: 64
End: 2024-02-12
Payer: COMMERCIAL

## 2024-02-12 DIAGNOSIS — R26.2 DIFFICULTY IN WALKING: Primary | ICD-10-CM

## 2024-02-12 DIAGNOSIS — M76.811 ANTERIOR TIBIALIS TENDINITIS, RIGHT: ICD-10-CM

## 2024-02-12 PROCEDURE — 97110 THERAPEUTIC EXERCISES: CPT | Mod: GP | Performed by: PHYSICAL THERAPIST

## 2024-02-12 ASSESSMENT — PAIN SCALES - GENERAL: PAINLEVEL_OUTOF10: 0 - NO PAIN

## 2024-02-12 ASSESSMENT — PAIN - FUNCTIONAL ASSESSMENT: PAIN_FUNCTIONAL_ASSESSMENT: 0-10

## 2024-02-12 NOTE — PROGRESS NOTES
"Physical Therapy    Physical Therapy Treatment    Patient Name: Margarito Dumont  MRN: [id number]  Today's Date: 2/12/2024  Time Calculation  Start Time: 1015  Stop Time: 1055  Time Calculation (min): 40 min1958     PT Therapeutic Procedures Time Entry  Therapeutic Exercise Time Entry: 40           Visit: #2      Assessment:  Pt. Tolerates all therEx without boot R.         Plan:  Cont. Increase strength and ROM.      Current Problem  1. Difficulty in walking        2. Anterior tibialis tendinitis, right  Follow Up In Physical Therapy          Subjective   Pt. States her HEP is going well.         Pain  Pain Assessment: 0-10  Pain Score: 0 - No pain  Pain Type: Acute pain  Pain Location: Ankle  Pain Orientation: Right    Objective     All therEx without boot.        Treatments:   Other:  VISIT 1: 2-5-24 : EVAL AND HEP. Issued heel lift L.    Per script: wean from boot as tolerated; ROM; PRE's; Stretching; modalities    EXERCISES       Date 2-12-24      VISIT# #2 # # #    REPS REPS REPS REPS          Nustep L1 10             Rkbd:       df 30\"x3      Up / down 10x2      Inver / ever 10x2             Side amb 2laps      Back amb 2 laps      Tandem amb       Over cones side                          fwd              Step ups 5\" step 10x2 ea      Heel raises       sls 30\" x4      Tandem stance 30\"x4                    shuttle       dlp 5b 10x2      slp 4b 10x2      hr 4b 10x2             SLS rebounder                                   Ifc prn                                   HEP             OP EDUCATION:       Goals:  Active       Tibialis anterior tendonitis; difficulty ambulating       Pt. will be indep with HeP.  (Progressing)       Start:  02/05/24    Expected End:  05/05/24            Pt. will c/o less than 2/10 night pain after a day of work.       Start:  02/05/24    Expected End:  05/05/24            Pt. will increase R ankle AROM to wnl to allow up and down stairs wnl.       Start:  02/05/24    Expected " End:  05/05/24            Pt. will increase R LE strength to wnl to allow RTW wfl.       Start:  02/05/24    Expected End:  05/05/24

## 2024-02-20 ENCOUNTER — TREATMENT (OUTPATIENT)
Dept: PHYSICAL THERAPY | Facility: HOSPITAL | Age: 64
End: 2024-02-20
Payer: COMMERCIAL

## 2024-02-20 DIAGNOSIS — M76.811 ANTERIOR TIBIALIS TENDINITIS, RIGHT: ICD-10-CM

## 2024-02-20 PROCEDURE — 97110 THERAPEUTIC EXERCISES: CPT | Mod: GP,CQ | Performed by: PHYSICAL THERAPY ASSISTANT

## 2024-02-20 ASSESSMENT — PAIN SCALES - GENERAL: PAINLEVEL_OUTOF10: 0 - NO PAIN

## 2024-02-20 ASSESSMENT — PAIN - FUNCTIONAL ASSESSMENT: PAIN_FUNCTIONAL_ASSESSMENT: 0-10

## 2024-02-20 NOTE — PROGRESS NOTES
"Physical Therapy    Physical Therapy Treatment    Patient Name: Margarito Dumont  MRN: [id number]  Today's Date: 2/20/2024  Time Calculation  Start Time: 0945  Stop Time: 1030  Time Calculation (min): 45 min    PT Therapeutic Procedures Time Entry  Therapeutic Exercise Time Entry: 45        VISIT# 3    Current Problem  1. Anterior tibialis tendinitis, right  Follow Up In Physical Therapy          Subjective      Pt reports she worked Sat/Sun and foot was sore. Resolved since she has been off for two days.   Precautions  Precautions  STEADI Fall Risk Score (The score of 4 or more indicates an increased risk of falling): 4  Medical Precautions:  (osteoperosis, OA, RA, HTN)       Pain  Pain Assessment: 0-10  Pain Score: 0 - No pain  Pain Type: Acute pain  Pain Location: Ankle  Pain Orientation: Right       Objective     Pt is no longer wearing boot.   Added cone taps and foam activities, increased resistance on shuttle,   Treatments:  EXERCISES       Date 2-12-24 2/2/24     VISIT# #2 #3 # #    REPS REPS REPS REPS          Nustep L1 10 L2 x 10            Rkbd:       df 30\"x3      Up / down 10x2 X 25/1min balance  1 fingertip     Inver / ever 10x2 X 25/1min balance  1 fingertip            Side amb 2laps      Back amb 2 laps      Tandem amb  2 laps     Over cones side  Taps x 5 /3 cones                        fwd              Step ups 5\" step 10x2 ea 6\" 10 x 2     Lateral step overs  10 x 2     Heel/toe raises on foam  X 20     MIP on foam  X 20     sls 30\" x4 30\" x 4     Tandem stance 30\"x4 30\" x 4                   shuttle       dlp 5b 10x2 6B 3 x 10     slp 4b 10x2 5B 3 x 10     hr 4b 10x2 5B 3 x 10            SLS rebounder                                   Ifc prn                                   HEP            Assessment:   Pt reports no pain this session, states\"actually feels really good\"    OP EDUCATION:   Reviewed HEP    Plan:   Cont. Increase strength and ROM.     Goals:  Active       Tibialis anterior " tendonitis; difficulty ambulating       Pt. will be indep with HeP.  (Progressing)       Start:  02/05/24    Expected End:  05/05/24            Pt. will c/o less than 2/10 night pain after a day of work.       Start:  02/05/24    Expected End:  05/05/24            Pt. will increase R ankle AROM to wnl to allow up and down stairs wnl.       Start:  02/05/24    Expected End:  05/05/24            Pt. will increase R LE strength to wnl to allow RTW wfl.       Start:  02/05/24    Expected End:  05/05/24

## 2024-02-27 ENCOUNTER — TREATMENT (OUTPATIENT)
Dept: PHYSICAL THERAPY | Facility: HOSPITAL | Age: 64
End: 2024-02-27
Payer: COMMERCIAL

## 2024-02-27 DIAGNOSIS — M76.811 ANTERIOR TIBIALIS TENDINITIS, RIGHT: ICD-10-CM

## 2024-02-27 DIAGNOSIS — R26.2 DIFFICULTY IN WALKING: Primary | ICD-10-CM

## 2024-02-27 PROCEDURE — 97110 THERAPEUTIC EXERCISES: CPT | Mod: GP,CQ

## 2024-02-27 ASSESSMENT — PAIN SCALES - GENERAL: PAINLEVEL_OUTOF10: 3

## 2024-02-27 ASSESSMENT — PAIN - FUNCTIONAL ASSESSMENT: PAIN_FUNCTIONAL_ASSESSMENT: 0-10

## 2024-02-27 NOTE — PROGRESS NOTES
"Physical Therapy    Physical Therapy Treatment    Patient Name: Margarito Dumont  MRN: [id number]  Today's Date: 2/27/2024  Time Calculation  Start Time: 0101  Stop Time: 0145  Time Calculation (min): 44 min    PT Therapeutic Procedures Time Entry  Therapeutic Exercise Time Entry: 44        VISIT# 4    Current Problem  1. Difficulty in walking        2. Anterior tibialis tendinitis, right  Follow Up In Physical Therapy          Subjective      Pt noted since last visit her ankle has had increased tingling with motions. She cannot sleep on her side as it throbs and is uncomfortable. She hasn't felt this way since before she was wearing the boot. It doesn't bother her until she's done working or if she completes certain movements.    Precautions  Precautions  STEADI Fall Risk Score (The score of 4 or more indicates an increased risk of falling): 4       Pain  Pain Assessment: 0-10  Pain Score: 3  Pain Type: Acute pain  Pain Location: Ankle  Pain Orientation: Right       Objective     Added lateral and forward cone ambulation    Treatments:  EXERCISES       Date 2-12-24 2/2/24 2/27//24    VISIT# #2 #3 #4 #    REPS REPS REPS REPS          Nustep L1 10 L2 x 10 L2 10'           Rkbd:       df 30\"x3      Up / down 10x2 X 25/1min balance  1 fingertip     Inver / ever 10x2 X 25/1min balance  1 fingertip            Side amb 2laps      Back amb 2 laps      Tandem amb  2 laps 1.5 laps    Over cones side  Taps x 5 /3 cones 2 laps                       fwd   2 laps           Step ups 5\" step 10x2 ea 6\" 10 x 2 6\" 10 x 2    Lateral step overs  10 x 2 6\" 10 x 2    Heel/toe raises on foam  X 20 x20    MIP on foam  X 20 x20    sls 30\" x4 30\" x 4 30\" x 4 0-1 finger touch    Tandem stance 30\"x4 30\" x 4 30\" x 4 foam                  shuttle       dlp 5b 10x2 6B 3 x 10 6B 3x10    slp 4b 10x2 5B 3 x 10 5B 3 x 10    hr 4b 10x2 5B 3 x 10 5B 3 x 10           SLS rebounder                                   Ifc prn                                 "   HEP            Assessment:   Pt needed visual cues for lifting foot up and over cones for ambulation. No change in pain after visit today. Will monitor sx.     OP EDUCATION:   Reviewed HEP    Plan:   Cont. Increase strength and ROM.     Goals:  Active       Tibialis anterior tendonitis; difficulty ambulating       Pt. will be indep with HeP.  (Progressing)       Start:  02/05/24    Expected End:  05/05/24            Pt. will c/o less than 2/10 night pain after a day of work.       Start:  02/05/24    Expected End:  05/05/24            Pt. will increase R ankle AROM to wnl to allow up and down stairs wnl.       Start:  02/05/24    Expected End:  05/05/24            Pt. will increase R LE strength to wnl to allow RTW wfl.       Start:  02/05/24    Expected End:  05/05/24

## 2024-03-04 ENCOUNTER — TREATMENT (OUTPATIENT)
Dept: PHYSICAL THERAPY | Facility: HOSPITAL | Age: 64
End: 2024-03-04
Payer: COMMERCIAL

## 2024-03-04 DIAGNOSIS — M76.811 ANTERIOR TIBIALIS TENDINITIS, RIGHT: ICD-10-CM

## 2024-03-04 DIAGNOSIS — R26.2 DIFFICULTY IN WALKING: Primary | ICD-10-CM

## 2024-03-04 PROCEDURE — 97110 THERAPEUTIC EXERCISES: CPT | Mod: GP | Performed by: PHYSICAL THERAPIST

## 2024-03-04 ASSESSMENT — PAIN SCALES - GENERAL: PAINLEVEL_OUTOF10: 6

## 2024-03-04 ASSESSMENT — PAIN - FUNCTIONAL ASSESSMENT: PAIN_FUNCTIONAL_ASSESSMENT: 0-10

## 2024-03-04 NOTE — PROGRESS NOTES
"Physical Therapy    Physical Therapy Treatment recheck    Patient Name: Margarito Dumont  MRN: [id number]  Today's Date: 3/4/2024  Time Calculation  Start Time: 1115  Stop Time: 1150  Time Calculation (min): 35 min    PT Therapeutic Procedures Time Entry  Therapeutic Exercise Time Entry: 35        VISIT# 5    Current Problem  1. Difficulty in walking        2. Anterior tibialis tendinitis, right  Follow Up In Physical Therapy          Subjective      Pt noted since last visit her ankle has had increased tingling with motions. She cannot sleep on her side as it throbs and is uncomfortable. She hasn't felt this way since before she was wearing the boot. It doesn't bother her until she's done working or if she completes certain movements.    Precautions          Pain  Pain Assessment: 0-10  Pain Score: 6  Pain Type: Acute pain  Pain Location: Ankle  Pain Orientation: Right       Objective     Range of Motion:  R ankle :   DF: 15   PF: 60   Inver / Ever: wnl     Strength:  L LE wfl  R hip wnl  R knee 4-4+/5   R ankle df /pf 4+/5                Inv / ever 4/5  Palpation:  With R knee flexed and  R ankle DF pt. With n/t with touch to anterior shin area pt. With tingling in toes  Pain at medial plantar surface.   Tingling in toes with palpation lateral maleolli area R.   Special Tests:     Gait:  Pt. Amb. With minimal antalgia indep      Issued compression stockinette R LE.   Treatments:  EXERCISES    Recheck   Date 2-12-24 2/2/24 2/27//24 3-4-24   VISIT# #2 #3 #4 #5    REPS REPS REPS REPS          Nustep L1 10 L2 x 10 L2 10'           Rkbd:       df 30\"x3   30\"x3   Up / down 10x2 X 25/1min balance  1 fingertip  25   Inver / ever 10x2 X 25/1min balance  1 fingertip  25          Side amb 2laps      Back amb 2 laps      Tandem amb  2 laps 1.5 laps    Over cones side  Taps x 5 /3 cones 2 laps                       fwd   2 laps           Step ups 5\" step 10x2 ea 6\" 10 x 2 6\" 10 x 2    Lateral step overs  10 x 2 6\" 10 x 2  " "  Heel/toe raises on foam  X 20 x20    MIP on foam  X 20 x20    sls 30\" x4 30\" x 4 30\" x 4 0-1 finger touch    Tandem stance 30\"x4 30\" x 4 30\" x 4 foam                  shuttle       dlp 5b 10x2 6B 3 x 10 6B 3x10    slp 4b 10x2 5B 3 x 10 5B 3 x 10    hr 4b 10x2 5B 3 x 10 5B 3 x 10           SLS rebounder              Towel toe scrunches    10x2   [place]     1x   Golf ball roll    1' x 2          Ifc prn                                   HEP            Assessment:  Pt. With normal ROM and strength, gait is wfl. Pt. Does present with increasing pain and tingling on top of toes.     OP EDUCATION:       Plan:   Pt. To Call for follow up with . She will call to place PT on hold or cont. Pending pain and drAvi Appointment.      Goals:  Active       Tibialis anterior tendonitis; difficulty ambulating       Pt. will be indep with HeP.  (Progressing)       Start:  02/05/24    Expected End:  05/05/24            Pt. will c/o less than 2/10 night pain after a day of work. (Progressing)       Start:  02/05/24    Expected End:  05/05/24            Pt. will increase R ankle AROM to wnl to allow up and down stairs wnl.       Start:  02/05/24    Expected End:  05/05/24            Pt. will increase R LE strength to wnl to allow RTW wfl.       Start:  02/05/24    Expected End:  05/05/24               "

## 2024-03-11 ENCOUNTER — APPOINTMENT (OUTPATIENT)
Dept: PHYSICAL THERAPY | Facility: HOSPITAL | Age: 64
End: 2024-03-11
Payer: COMMERCIAL

## 2024-03-13 ENCOUNTER — OFFICE VISIT (OUTPATIENT)
Dept: ORTHOPEDIC SURGERY | Facility: CLINIC | Age: 64
End: 2024-03-13
Payer: COMMERCIAL

## 2024-03-13 VITALS — BODY MASS INDEX: 27.48 KG/M2 | WEIGHT: 140 LBS | HEIGHT: 60 IN

## 2024-03-13 DIAGNOSIS — M66.869 TIBIALIS ANTERIOR TENDON TEAR, NONTRAUMATIC: ICD-10-CM

## 2024-03-13 PROCEDURE — 1159F MED LIST DOCD IN RCRD: CPT | Performed by: SPECIALIST

## 2024-03-13 PROCEDURE — 1036F TOBACCO NON-USER: CPT | Performed by: SPECIALIST

## 2024-03-13 PROCEDURE — 99213 OFFICE O/P EST LOW 20 MIN: CPT | Performed by: SPECIALIST

## 2024-03-13 NOTE — PROGRESS NOTES
Follow up Right anterior tibialis tendinitis - has completed all of her therapy and pain is getting worse.   She is now experiencing numbness and tingling in her toes.  She is walking in a normal shoe today.     Exam: Right foot without erythema or swelling.  Still has pain to palpation at the insertion of the anterior tibialis.  This is exacerbated with resisted dorsiflexion which remains 5 out of 5.  No to minimal pain with rotational stress or Lisfranc's but pain along the region of the medial midfoot.  Dermis is intact neurovascular intact    Assessment/plan: Concern for partial tear anterior tibialis not healing.  She has failed conservative treatment and recommend an MRI of the ankle to include the course of the anterior tibialis.  Follow-up after the above

## 2024-03-18 ENCOUNTER — APPOINTMENT (OUTPATIENT)
Dept: PHYSICAL THERAPY | Facility: HOSPITAL | Age: 64
End: 2024-03-18
Payer: COMMERCIAL

## 2024-03-18 ENCOUNTER — DOCUMENTATION (OUTPATIENT)
Dept: PHYSICAL THERAPY | Facility: HOSPITAL | Age: 64
End: 2024-03-18
Payer: COMMERCIAL

## 2024-03-19 NOTE — PROGRESS NOTES
Physical Therapy                 Therapy Communication Note    Patient Name: Margarito Dumont  MRN: [id number]  Today's Date: 3/19/2024   : 1960      Discipline: Physical Therapy    Missed Visit Reason: via reminder system    Missed Time: cancel         Comment:

## 2024-04-03 ENCOUNTER — HOSPITAL ENCOUNTER (OUTPATIENT)
Dept: RADIOLOGY | Facility: CLINIC | Age: 64
Discharge: HOME | End: 2024-04-03
Payer: COMMERCIAL

## 2024-04-03 DIAGNOSIS — M66.869 TIBIALIS ANTERIOR TENDON TEAR, NONTRAUMATIC: ICD-10-CM

## 2024-04-03 PROCEDURE — 73721 MRI JNT OF LWR EXTRE W/O DYE: CPT | Mod: RIGHT SIDE | Performed by: RADIOLOGY

## 2024-04-03 PROCEDURE — 73721 MRI JNT OF LWR EXTRE W/O DYE: CPT | Mod: RT

## 2024-04-17 ENCOUNTER — OFFICE VISIT (OUTPATIENT)
Dept: ORTHOPEDIC SURGERY | Facility: CLINIC | Age: 64
End: 2024-04-17
Payer: COMMERCIAL

## 2024-04-17 VITALS — HEIGHT: 60 IN | BODY MASS INDEX: 27.48 KG/M2 | WEIGHT: 140 LBS

## 2024-04-17 DIAGNOSIS — M66.869 TIBIALIS ANTERIOR TENDON TEAR, NONTRAUMATIC: Primary | ICD-10-CM

## 2024-04-17 PROCEDURE — 1159F MED LIST DOCD IN RCRD: CPT | Performed by: SPECIALIST

## 2024-04-17 PROCEDURE — L1902 AFO ANKLE GAUNTLET PRE OTS: HCPCS | Performed by: SPECIALIST

## 2024-04-17 PROCEDURE — 99214 OFFICE O/P EST MOD 30 MIN: CPT | Performed by: SPECIALIST

## 2024-04-17 NOTE — PROGRESS NOTES
Follow up Right anterior tibialis tendinitis - MRI Results  Her anterior tibial insertional region still remains painful but better controlled.  Denies any new constitutional symptoms.    Exam: Unchanged to the right ankle with pain at the insertion of the anterior tibialis.  Active resisted dorsiflexion still strong.  Single stance heel rise did not show medial ankle pain.  Dermis intact neurovascular intact.  No evidence of erythema or swelling.    MRI: Reviewed results with patient.  Shows mild anterior tibialis tendinosis near the insertion.  Other areas of multiple tendinitis were noted minimal bone or joint involvement.    Assessment plan: Anterior tibialis tendinosis/tendinitis.  Because her symptoms are somewhat improving.  He continue with conservative measures.  I think she is at this stage where simple athletic bracing would give her adequate support as opposed to immobilization.  She is instructed on gentle strengthening and stretching of the tendon.  She knows there is some risk of rupture in the future so if symptoms worsen she should return for reassessment.

## 2024-08-08 ENCOUNTER — TELEPHONE (OUTPATIENT)
Dept: PRIMARY CARE | Facility: CLINIC | Age: 64
End: 2024-08-08
Payer: COMMERCIAL

## 2024-08-08 DIAGNOSIS — M06.9 RHEUMATOID ARTHRITIS, INVOLVING UNSPECIFIED SITE, UNSPECIFIED WHETHER RHEUMATOID FACTOR PRESENT (MULTI): Primary | ICD-10-CM

## 2024-08-08 DIAGNOSIS — I10 HYPERTENSION, UNSPECIFIED TYPE: ICD-10-CM

## 2024-08-08 RX ORDER — VERAPAMIL HYDROCHLORIDE 180 MG/1
180 TABLET, FILM COATED, EXTENDED RELEASE ORAL
Qty: 90 TABLET | Refills: 0 | Status: SHIPPED | OUTPATIENT
Start: 2024-08-08

## 2024-08-08 NOTE — TELEPHONE ENCOUNTER
Pt called requesting refill on rx verapamil. Last refill was given for 6 months at last visit but patient had multiple bottles already, her original pharmacy sent them in advance than she change to [facility] pharmacy who gave her even more. So that's why she didn't need the refill so quickly.     LOV 7/17/23  NOV 9/9/24    Joe on file please.

## 2024-08-12 ENCOUNTER — TELEPHONE (OUTPATIENT)
Dept: PRIMARY CARE | Facility: CLINIC | Age: 64
End: 2024-08-12
Payer: COMMERCIAL

## 2024-08-12 RX ORDER — FOLIC ACID 1 MG/1
1 TABLET ORAL
Qty: 90 TABLET | Refills: 3 | Status: SHIPPED | OUTPATIENT
Start: 2024-08-12

## 2024-08-12 NOTE — TELEPHONE ENCOUNTER
Pt called back requesting folic Acid to now be sent in please.     [facility] pharmacy please    Lov 7/17/23  NOV 9/9/24

## 2024-08-15 NOTE — PROGRESS NOTES
Subjective   Patient ID: Margarito Dumont is a 63 y.o. female who presents for No chief complaint on file..  HPI  This 63-year-old female is being seen back in the office in follow-up.  She was seen last October for a recheck of her head neck.  She does have a history of a mixed hearing loss with significant sclerosis involving the right ear.  There is a past history of barotrauma during airflight and at times recurrence of otitis media effusion as a result.  She has had some popping sounds in her ears of late but no change in her overall hearing function.  Her last audiogram was in October and she is scheduled for an updated one this fall.  Review of Systems  A 12 point ROS has been reviewed and are negative for complaint except what is stated in the history of present illness and/or past medical history as noted in the EMR  Past Medical History:   Diagnosis Date    Abnormal TSH     Acute lower UTI     Acute otitis externa     Acute recurrent pansinusitis     Acute sinus infection     At risk of diabetes mellitus     Bad odor of urine     Chronic rhinitis     Class 1 obesity with body mass index (BMI) of 30.0 to 30.9 in adult     Depression     Deviated nasal septum     Diarrhea     Dysuria     Elevated LDL cholesterol level     Erosive (osteo)arthritis     Excessive cerumen in right ear canal     Fatigue     Flank pain     HTN (hypertension)     HTN (hypertension)     Hyperlipidemia     Hyperlipidemia     Hypertrophy of inferior nasal turbinate     Left ear pain     Left wrist pain     Leukopenia     Localized primary osteoarthritis of first carpometacarpal joint of left wrist     Nasal crusting     Non-smoker     Non-smoker     Osteopenia     Personal history of other drug therapy     COVID-19 vaccine series completed    Poison ivy dermatitis     Polyarthralgia     Post-nasal drainage     Pre-diabetes     Rheumatoid arthritis (Multi)     Seborrheic keratoses     Tendinitis of hand     Unspecified abnormal  findings in urine 12/07/2020    Bad odor of urine    Urinary frequency     UTI (urinary tract infection)     Vitamin D deficiency         Current Outpatient Medications:     acetaminophen (Tylenol) 500 mg tablet, Take 2 tablets (1,000 mg) by mouth every 8 hours., Disp: , Rfl:     alendronate (Fosamax) 70 mg tablet, TAKE 1 TABLET BY MOUTH ONE TIME A WEEK IN THE MORNING WITH A GLASS OF WATER ON EMPTY STOMACH DO NOT TAKE ANYTHING ELSE OR LIE DOWN FOR 30 MINUTES, Disp: , Rfl:     cholecalciferol (Vitamin D-3) 50 mcg (2,000 unit) capsule, Take by mouth., Disp: , Rfl:     fluticasone (Flonase) 50 mcg/actuation nasal spray, Administer 2 sprays into each nostril., Disp: , Rfl:     folic acid (Folvite) 1 mg tablet, Take 1 tablet (1 mg) by mouth once daily., Disp: 90 tablet, Rfl: 3    ibuprofen 800 mg tablet, , Disp: , Rfl:     L. acidophilus/Bifid. animalis 15.5 billion cell capsule, Take by mouth., Disp: , Rfl:     lisinopril 30 mg tablet, Take 1 tablet (30 mg) by mouth once daily., Disp: 90 tablet, Rfl: 1    methotrexate (Trexall) 2.5 mg tablet, Take 8 tablets (20 mg total) by mouth once a week., Disp: , Rfl:     naproxen (Naprosyn) 375 mg tablet, Take 1 tablet (375 mg) by mouth 2 times a day with meals., Disp: 60 tablet, Rfl: 0    venlafaxine 225 mg 24 hr tablet, Take 1 tablet (225 mg) by mouth once daily., Disp: 90 tablet, Rfl: 1    verapamil SR (Calan SR) 180 mg ER tablet, Take 1 tablet (180 mg) by mouth once daily., Disp: 90 tablet, Rfl: 0   Allergies   Allergen Reactions    Hydrocodone-Acetaminophen Anaphylaxis and Swelling    Oxycodone-Acetaminophen Diarrhea, Hallucinations, Hives, Unknown and GI Upset     Dizziness    Other Other    Adhesive Unknown    Sulfa (Sulfonamide Antibiotics) Hives    Tyloxapol Unknown    Latex, Natural Rubber Rash    Tapentadol Rash       There were no vitals taken for this visit.    Objective   Physical Exam  GENERAL MUNA.EARANCE: Alert, in no acute distress, normal pitch and clarity of  voice, well-developed and nourished, cooperative.     HEAD/FACE: Normocephalic, atraumatic, normal facial movements and strength, no no tenderness to palpation, no lesions noted.     SKIN: Normal turgor, no raised or ulcerative lesions, warm and dry to palpation.     EYES: Extraocular motions intact, no nystagmus noted, pupils equal and reactive to light and accommodation, no conjunctivitis.     EARS:  -See procedure note     NOSE: No external skin lesions are noted, nares are patent, septum is intact, no evidence for purulent discharge was noted by anterior rhinoscopy. She status post turbinate reduction. Sinuses are nontender to palpation bilaterally, no intranasal lesions or inflammation is noted, nasal valve is normal.     OROPHARYNX/ORAL CAVITY: Oropharynx is not inflamed and is without lesions, mucosa of the oral cavity is intact and without lesions, tongue is midline and mobile, upper plate, no mucous membrane pathology was noted on the palate or oropharynx, TMJ was mobile     NECK: No lymphadenopathy is palpated, carotid pulses are intact, neck is supple with full range of motion, no thyroid abnormalities are noted, trachea is midline, no neck masses are palpated.        NEUROLOGIC/PSYCH; alert and oriented, cranial nerves are grossly intact, gait is without falling, no motor deficits are noted    Patient ID: Margarito Dumont is a 63 y.o. female.    Binocular microscopy    Date/Time: 8/19/2024 10:41 AM    Performed by: Ortiz Moore DMD, MD  Authorized by: Ortiz Moore DMD, MD    Consent:     Consent obtained:  Verbal    Consent given by:  Patient    Risks discussed:  Pain    Alternatives discussed:  No treatment and observation  Post-procedure details:     Patient tolerance of procedure:  Tolerated well, no immediate complications  Comments:      external ear on the right was negative for inflammation the canal was slightly narrow at its opening. There was some cerumen debris removed with a wax  loop microscopically. The ear canal itself was noninflamed and the surface of the tympanic membrane was noninflamed and intact with  diffuse sclerosis of the tympanic membrane consistent with her past exams. On the left-hand side there was a patent canal with a monomeric tympanic membrane with dryness noted.  Sclerotic changes around the periphery were noted and around the annulus posteriorly there did not appear to be any evidence for middle ear inflammation on the left.         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  Her exam today did not show evidence for acute inflammation.  She has very dense appearance to the right tympanic membrane from tympanosclerosis.  This is unchanged on physical exam.  There did not appear to be any fluid signal on her left which has a more monomeric appearance to the tympanic membrane.  We talked about barotrauma and ways to try to mitigate that using a decongestant before flight such as Afrin, a topical steroid eel spray perhaps a week before flying, airplane plugs and auto insufflation of the ear.  She will be seen back in October for a scheduled recheck of her hearing.    Ortiz Moore DMD, MD 08/15/24 8:14 AM

## 2024-08-19 ENCOUNTER — APPOINTMENT (OUTPATIENT)
Dept: OTOLARYNGOLOGY | Facility: CLINIC | Age: 64
End: 2024-08-19
Payer: COMMERCIAL

## 2024-08-19 VITALS — WEIGHT: 140 LBS | HEIGHT: 60 IN | BODY MASS INDEX: 27.48 KG/M2

## 2024-08-19 DIAGNOSIS — H74.01 TYMPANOSCLEROSIS OF RIGHT EAR: ICD-10-CM

## 2024-08-19 DIAGNOSIS — H61.21 IMPACTED CERUMEN OF RIGHT EAR: ICD-10-CM

## 2024-08-19 DIAGNOSIS — H90.6 MIXED CONDUCTIVE AND SENSORINEURAL HEARING LOSS OF BOTH EARS: Primary | ICD-10-CM

## 2024-08-19 PROCEDURE — 1160F RVW MEDS BY RX/DR IN RCRD: CPT | Performed by: OTOLARYNGOLOGY

## 2024-08-19 PROCEDURE — 99213 OFFICE O/P EST LOW 20 MIN: CPT | Performed by: OTOLARYNGOLOGY

## 2024-08-19 PROCEDURE — 1159F MED LIST DOCD IN RCRD: CPT | Performed by: OTOLARYNGOLOGY

## 2024-08-19 PROCEDURE — 3008F BODY MASS INDEX DOCD: CPT | Performed by: OTOLARYNGOLOGY

## 2024-08-19 PROCEDURE — 1036F TOBACCO NON-USER: CPT | Performed by: OTOLARYNGOLOGY

## 2024-09-09 ENCOUNTER — APPOINTMENT (OUTPATIENT)
Dept: PRIMARY CARE | Facility: CLINIC | Age: 64
End: 2024-09-09
Payer: COMMERCIAL

## 2024-09-09 VITALS
OXYGEN SATURATION: 100 % | SYSTOLIC BLOOD PRESSURE: 124 MMHG | BODY MASS INDEX: 28.86 KG/M2 | HEIGHT: 60 IN | WEIGHT: 147 LBS | HEART RATE: 70 BPM | DIASTOLIC BLOOD PRESSURE: 68 MMHG

## 2024-09-09 DIAGNOSIS — Z13.29 THYROID DISORDER SCREEN: ICD-10-CM

## 2024-09-09 DIAGNOSIS — E78.5 HYPERLIPIDEMIA, UNSPECIFIED HYPERLIPIDEMIA TYPE: ICD-10-CM

## 2024-09-09 DIAGNOSIS — R73.03 PREDIABETES: ICD-10-CM

## 2024-09-09 DIAGNOSIS — F32.A DEPRESSION, UNSPECIFIED DEPRESSION TYPE: ICD-10-CM

## 2024-09-09 DIAGNOSIS — Z12.31 ENCOUNTER FOR SCREENING MAMMOGRAM FOR MALIGNANT NEOPLASM OF BREAST: ICD-10-CM

## 2024-09-09 DIAGNOSIS — I10 HYPERTENSION, UNSPECIFIED TYPE: ICD-10-CM

## 2024-09-09 DIAGNOSIS — Z00.00 WELLNESS EXAMINATION: Primary | ICD-10-CM

## 2024-09-09 DIAGNOSIS — Z13.6 SCREENING FOR HEART DISEASE: ICD-10-CM

## 2024-09-09 DIAGNOSIS — J34.89 NASAL DISCHARGE: ICD-10-CM

## 2024-09-09 DIAGNOSIS — Z87.09 HISTORY OF PARANASAL SINUS CONGESTION: ICD-10-CM

## 2024-09-09 DIAGNOSIS — E55.9 VITAMIN D DEFICIENCY: ICD-10-CM

## 2024-09-09 PROBLEM — J45.909 ASTHMA (HHS-HCC): Status: RESOLVED | Noted: 2022-10-10 | Resolved: 2024-09-09

## 2024-09-09 PROBLEM — R92.8 ABNORMAL MAMMOGRAM: Status: ACTIVE | Noted: 2024-09-09

## 2024-09-09 PROBLEM — G56.00 CARPAL TUNNEL SYNDROME: Status: ACTIVE | Noted: 2024-09-09

## 2024-09-09 PROCEDURE — 1036F TOBACCO NON-USER: CPT | Performed by: NURSE PRACTITIONER

## 2024-09-09 PROCEDURE — 3008F BODY MASS INDEX DOCD: CPT | Performed by: NURSE PRACTITIONER

## 2024-09-09 PROCEDURE — 1159F MED LIST DOCD IN RCRD: CPT | Performed by: NURSE PRACTITIONER

## 2024-09-09 PROCEDURE — 99397 PER PM REEVAL EST PAT 65+ YR: CPT | Performed by: NURSE PRACTITIONER

## 2024-09-09 PROCEDURE — 3078F DIAST BP <80 MM HG: CPT | Performed by: NURSE PRACTITIONER

## 2024-09-09 PROCEDURE — 3074F SYST BP LT 130 MM HG: CPT | Performed by: NURSE PRACTITIONER

## 2024-09-09 RX ORDER — VERAPAMIL HYDROCHLORIDE 180 MG/1
180 TABLET, FILM COATED, EXTENDED RELEASE ORAL
Qty: 90 TABLET | Refills: 3 | Status: SHIPPED | OUTPATIENT
Start: 2024-09-09

## 2024-09-09 RX ORDER — AMOXICILLIN 500 MG/1
500 CAPSULE ORAL EVERY 8 HOURS SCHEDULED
Qty: 30 CAPSULE | Refills: 0 | Status: SHIPPED | OUTPATIENT
Start: 2024-09-09 | End: 2024-09-19

## 2024-09-09 RX ORDER — FLUTICASONE PROPIONATE 50 MCG
2 SPRAY, SUSPENSION (ML) NASAL DAILY
Qty: 16 G | Refills: 11 | Status: SHIPPED | OUTPATIENT
Start: 2024-09-09

## 2024-09-09 RX ORDER — LISINOPRIL 30 MG/1
30 TABLET ORAL
Qty: 90 TABLET | Refills: 3 | Status: SHIPPED | OUTPATIENT
Start: 2024-09-09

## 2024-09-09 RX ORDER — VENLAFAXINE HYDROCHLORIDE 225 MG/1
225 TABLET, EXTENDED RELEASE ORAL DAILY
Qty: 90 TABLET | Refills: 3 | Status: SHIPPED | OUTPATIENT
Start: 2024-09-09

## 2024-09-09 ASSESSMENT — ENCOUNTER SYMPTOMS
NEUROLOGICAL NEGATIVE: 1
CARDIOVASCULAR NEGATIVE: 1
SLEEP DISTURBANCE: 0
GASTROINTESTINAL NEGATIVE: 1
HEMATOLOGIC/LYMPHATIC NEGATIVE: 1
CONSTITUTIONAL NEGATIVE: 1
ARTHRALGIAS: 1
PSYCHIATRIC NEGATIVE: 1
RESPIRATORY NEGATIVE: 1

## 2024-09-09 NOTE — PROGRESS NOTES
Subjective   Patient ID: Margarito Dumont is a 63 y.o. female who presents for Annual Exam.    HPI   Patient here for yearly wellness. Last seen on 07/17/2023.  Plans to retire in March/April next year.   Current concern : NONE  Chronic concerns: Depression,  HTN, Hyperlipidemia, RA, Prediabetes, Vitamin D deficiency, Chronic rhinitis, Depression & anxiety, asthma, osteopenia   Specialist    - Dermatologist- every 12 months skin checks  - Allergist  - Otolaryngology - every six months cerumen removed (Dr Moore)   - Rheumatologists  - Urogyn - Dr. Vignesh Flynn rheumatology 04/09/2024    Review of Systems   Constitutional: Negative.    HENT: Negative.          Every six months has cerumen removed Dr Moore   DDS- every six months    Eyes:         Eye exam - glasses for driving and readers   Respiratory: Negative.     Cardiovascular: Negative.    Gastrointestinal: Negative.    Genitourinary:         Leakage with holding too long    Musculoskeletal:  Positive for arthralgias.   Skin: Negative.    Neurological: Negative.    Hematological: Negative.    Psychiatric/Behavioral: Negative.  Negative for sleep disturbance.      Objective   /68 (BP Location: Right arm, Patient Position: Sitting, BP Cuff Size: Adult)   Pulse 70   Ht 1.524 m (5')   Wt 66.7 kg (147 lb)   SpO2 100%   BMI 28.71 kg/m²   Weight in July 150. 6 lbs    Physical Exam  Vitals reviewed.   Constitutional:       Appearance: She is normal weight.   HENT:      Right Ear: Tympanic membrane and ear canal normal.      Left Ear: Tympanic membrane and ear canal normal.      Nose: Nose normal.      Mouth/Throat:      Lips: Pink.      Mouth: Mucous membranes are moist.      Pharynx: Oropharynx is clear.   Eyes:      General: Lids are normal.      Extraocular Movements: Extraocular movements intact.      Conjunctiva/sclera: Conjunctivae normal.      Pupils: Pupils are equal, round, and reactive to light.   Neck:      Thyroid: No thyromegaly.      Vascular:  No carotid bruit.   Cardiovascular:      Rate and Rhythm: Normal rate and regular rhythm.      Pulses:           Dorsalis pedis pulses are 2+ on the right side and 2+ on the left side.      Heart sounds: Normal heart sounds.      Comments: Bilateral ankles- puffy   Pulmonary:      Effort: Pulmonary effort is normal.      Breath sounds: Normal breath sounds. No decreased breath sounds, wheezing or rhonchi.   Abdominal:      General: Bowel sounds are normal.      Palpations: Abdomen is soft.      Tenderness: There is no abdominal tenderness.   Musculoskeletal:      Cervical back: Neck supple.   Lymphadenopathy:      Cervical: No cervical adenopathy.   Skin:     General: Skin is warm.      Findings: No rash.   Neurological:      General: No focal deficit present.      Mental Status: She is alert.      Gait: Gait is intact.      Deep Tendon Reflexes:      Reflex Scores:       Bicep reflexes are 2+ on the right side and 2+ on the left side.       Patellar reflexes are 2+ on the right side and 2+ on the left side.  Psychiatric:         Attention and Perception: Attention normal.         Behavior: Behavior normal. Behavior is cooperative.     Labs- latest through rheumatology   Influenza- recommend obtaining at pharmacy    Prevnar 13/20- recommend receiving at pharmacy    Shingrix- recommended (2nd time)  has yet to receive.   Colonoscopy 06/2022- follow up 5 years-  Cervical Cancer screen - Hysterectomy   Mammogram- 04/2022- order placed to update screening.   DEXA BONE Density through Rheumatology     Assessment/Plan   Diagnoses and all orders for this visit:  Wellness examination  reviewed screenings, immunizations and vital signs. Reviewed appropriate health screenings/practices: including regular DDS & eye exams, wearing sunscreen,    appropriate balanced diet, such as the Mediterranean diet or low fat heart healthy diet,  exercise - moderate activity of at least 150 minutes a week, obtain and maintain normal Body  Mass Index.        -     CBC and Auto Differential; Future  -     Comprehensive Metabolic Panel; Future  Prediabetes  -     Hemoglobin A1C; Future  Vitamin D deficiency  -     Vitamin D 25-Hydroxy,Total (for eval of Vitamin D levels); Future  Hypertension, unspecified type  -  refilled  lisinopril 30 mg tablet; Take 1 tablet (30 mg) by mouth once daily.  - refilled  verapamil SR (Calan SR) 180 mg ER tablet; Take 1 tablet (180 mg) by mouth once daily.  -     CBC and Auto Differential; Future  -     Comprehensive Metabolic Panel; Future  Depression, unspecified depression type  - refilled  venlafaxine 225 mg 24 hr tablet; Take 1 tablet (225 mg) by mouth once daily.  Nasal discharge  - refilled   fluticasone (Flonase) 50 mcg/actuation nasal spray; Administer 2 sprays into each nostril once daily.  History of paranasal sinus congestion  Patient requested antibiotic to take on 7 days cruise in case sinus infection starts. Provided but advised only to use if sinus pain, drainage, fever   -     amoxicillin (Amoxil) 500 mg capsule; Take 1 capsule (500 mg) by mouth every 8 hours for 10 days.  Encounter for screening mammogram for malignant neoplasm of breast  -     BI mammo bilateral screening tomosynthesis; Future  Hyperlipidemia, unspecified hyperlipidemia type  -     Lipid Panel; Future  Thyroid disorder screen  -     TSH with reflex to Free T4 if abnormal; Future  Screening for heart disease  -     CT cardiac scoring wo IV contrast; Future     PLAN: Follow up yearly as wellness  Lab ordered  Mammogram ordered  CT calcium score

## 2024-09-09 NOTE — PATIENT INSTRUCTIONS
Update flu vaccine,   Prevnar 20, Shingles vaccine at pharmacy  Labs are fasting 10-12 hours do not eat, please drink adequate water prior to lab draw.    Coronary calcium score ordered  Mammogram ordered.

## 2024-11-01 ENCOUNTER — APPOINTMENT (OUTPATIENT)
Dept: OTOLARYNGOLOGY | Facility: CLINIC | Age: 64
End: 2024-11-01
Payer: COMMERCIAL

## 2024-11-01 ENCOUNTER — APPOINTMENT (OUTPATIENT)
Dept: AUDIOLOGY | Facility: CLINIC | Age: 64
End: 2024-11-01
Payer: COMMERCIAL

## 2024-11-04 ENCOUNTER — HOSPITAL ENCOUNTER (OUTPATIENT)
Dept: RADIOLOGY | Facility: CLINIC | Age: 64
Discharge: HOME | End: 2024-11-04
Payer: COMMERCIAL

## 2024-11-04 VITALS — BODY MASS INDEX: 28.07 KG/M2 | WEIGHT: 143 LBS | HEIGHT: 60 IN

## 2024-11-04 DIAGNOSIS — Z12.31 ENCOUNTER FOR SCREENING MAMMOGRAM FOR MALIGNANT NEOPLASM OF BREAST: ICD-10-CM

## 2024-11-04 PROCEDURE — 77067 SCR MAMMO BI INCL CAD: CPT | Performed by: RADIOLOGY

## 2024-11-04 PROCEDURE — 77067 SCR MAMMO BI INCL CAD: CPT

## 2024-11-04 PROCEDURE — 77063 BREAST TOMOSYNTHESIS BI: CPT | Performed by: RADIOLOGY

## 2024-11-21 NOTE — PROGRESS NOTES
Subjective   Patient ID: Margarito Dumont is a 64 y.o. female who presents for No chief complaint on file..  HPI  This 64-year-old female is being seen back in the office in follow-up.  She has a mixed hearing loss in both ears with dense tympanosclerosis involving the right ear.  She is also had problems with barotrauma during airflight's and was advised to use a decongestant spray such as Afrin before flight along with a topical steroid spray 1 week before flying along with a airplane plugs to help minimize that difficulty.  She is here today for a repeat audiogram.  She has had some difficulties with postnasal discharge.  She is status post nasal surgery and did postoperatively use saline rinses but has not stayed with that.  She has used misting of her nose with saline along with Flonase.  She did undergo sinus surgery in 2022 with Dr. WISAM Martinez    Review of Systems   A 12 point ROS  has been reviewed and are negative for complaint except for what is stated in the history of present illness and /or for past medical history as noted in the EMR.    Past Medical History:   Diagnosis Date    Abnormal TSH     Acute lower UTI     Acute otitis externa     Acute recurrent pansinusitis     Acute sinus infection     At risk of diabetes mellitus     Atypical ductal hyperplasia, breast     Bad odor of urine     Chronic rhinitis     Class 1 obesity with body mass index (BMI) of 30.0 to 30.9 in adult     Depression     Deviated nasal septum     Diarrhea     Dysuria     Elevated LDL cholesterol level     Erosive (osteo)arthritis     Excessive cerumen in right ear canal     Fatigue     Flank pain     HTN (hypertension)     HTN (hypertension)     Hyperlipidemia     Hyperlipidemia     Hypertrophy of inferior nasal turbinate     Left ear pain     Left wrist pain     Leukopenia     Localized primary osteoarthritis of first carpometacarpal joint of left wrist     Nasal crusting     Non-smoker     Non-smoker     Osteopenia     Personal  history of other drug therapy     COVID-19 vaccine series completed    Poison ivy dermatitis     Polyarthralgia     Post-nasal drainage     Pre-diabetes     Rheumatoid arthritis     Seborrheic keratoses     Tendinitis of hand     Unspecified abnormal findings in urine 12/07/2020    Bad odor of urine    Urinary frequency     UTI (urinary tract infection)     Vitamin D deficiency           Current Outpatient Medications:     acetaminophen (Tylenol) 500 mg tablet, Take 2 tablets (1,000 mg) by mouth every 8 hours., Disp: , Rfl:     alendronate (Fosamax) 70 mg tablet, TAKE 1 TABLET BY MOUTH ONE TIME A WEEK IN THE MORNING WITH A GLASS OF WATER ON EMPTY STOMACH DO NOT TAKE ANYTHING ELSE OR LIE DOWN FOR 30 MINUTES, Disp: , Rfl:     cholecalciferol (Vitamin D-3) 50 mcg (2,000 unit) capsule, Take by mouth., Disp: , Rfl:     fluticasone (Flonase) 50 mcg/actuation nasal spray, Administer 2 sprays into each nostril once daily., Disp: 16 g, Rfl: 11    folic acid (Folvite) 1 mg tablet, Take 1 tablet (1 mg) by mouth once daily., Disp: 90 tablet, Rfl: 3    ibuprofen 800 mg tablet, , Disp: , Rfl:     L. acidophilus/Bifid. animalis 15.5 billion cell capsule, Take by mouth., Disp: , Rfl:     lisinopril 30 mg tablet, Take 1 tablet (30 mg) by mouth once daily., Disp: 90 tablet, Rfl: 3    methotrexate (Trexall) 2.5 mg tablet, Take 8 tablets (20 mg total) by mouth once a week., Disp: , Rfl:     naproxen (Naprosyn) 375 mg tablet, Take 1 tablet (375 mg) by mouth 2 times a day with meals., Disp: 60 tablet, Rfl: 0    venlafaxine 225 mg 24 hr tablet, Take 1 tablet (225 mg) by mouth once daily., Disp: 90 tablet, Rfl: 3    verapamil SR (Calan SR) 180 mg ER tablet, Take 1 tablet (180 mg) by mouth once daily., Disp: 90 tablet, Rfl: 3     Allergies   Allergen Reactions    Hydrocodone-Acetaminophen Anaphylaxis and Swelling    Oxycodone-Acetaminophen Diarrhea, Hallucinations, Hives, Unknown and GI Upset     Dizziness    Other Other    Adhesive Unknown     Sulfa (Sulfonamide Antibiotics) Hives    Tyloxapol Unknown    Adhesive Tape-Silicones Rash    Latex, Natural Rubber Rash    Tapentadol Rash       There were no vitals taken for this visit.    Objective   Physical Exam  GENERAL MUNA.EARANCE: Alert, in no acute distress, normal pitch and clarity of voice, well-developed and nourished, cooperative.     HEAD/FACE: Normocephalic, atraumatic, normal facial movements and strength, no no tenderness to palpation, no lesions noted.     SKIN: Normal turgor, no raised or ulcerative lesions, warm and dry to palpation.     EYES: Extraocular motions intact, no nystagmus noted, pupils equal and reactive to light and accommodation, no conjunctivitis.     EARS:  -See procedure note     NOSE: No external skin lesions are noted, nares are patent, septum is intact, no evidence for purulent discharge was noted by anterior rhinoscopy. She status post turbinate reduction. Sinuses are nontender to palpation bilaterally, no intranasal lesions or inflammation is noted, nasal valve is normal.     OROPHARYNX/ORAL CAVITY: Oropharynx is not inflamed and is without lesions, mucosa of the oral cavity is intact and without lesions, tongue is midline and mobile, upper plate, no mucous membrane pathology was noted on the palate or oropharynx, TMJ was mobile     NECK: No lymphadenopathy is palpated, carotid pulses are intact, neck is supple with full range of motion, no thyroid abnormalities are noted, trachea is midline, no neck masses are palpated.        NEUROLOGIC/PSYCH; alert and oriented, cranial nerves are grossly intact, gait is without falling, no motor deficits are noted    Patient ID: Margarito Dumont is a 64 y.o. female.    Binocular microscopy    Date/Time: 11/25/2024 2:35 PM    Performed by: Ortiz Moore DMD, MD  Authorized by: Ortiz Moore DMD, MD    Consent:     Consent obtained:  Verbal    Consent given by:  Patient    Risks discussed:  Pain    Alternatives discussed:  No  treatment and observation  Procedure details:     Indications: examination of tympanic membrane and debridement    Post-procedure details:     Patient tolerance of procedure:  Tolerated well, no immediate complications  Comments:      Microscopic evaluation of both ears was done.  On the right-hand side there was ceruminous and dry skin around the ear canal opening obstructing view.  This was removed microscopically.  Once removed the tympanic membrane was sclerotic consistent with past test.  On the left-hand side there was no obstruction from ceruminous debris mild remnants of dry skin were noted around the canal opening.  The tympanic membrane was intact monomeric with no signs of middle ear disease.      Her audiogram today continues to show a mild mixed hearing loss in the right ear up through 3000 Hz moderate to moderately severe in the upper frequencies of sound.  This is a mixed loss with a conductive component in the low to early mid frequencies.  Left-hand side there is a low normal to mild hearing loss up through 4000 Hz with a conductive component in the low-frequency area.  Discrimination scores are 100% on the right at 60 dB 92% on the left at 50 dB.  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  From the standpoint of her ear exam there was no changes noted with a dense appearing tympanic membrane on the right due to sclerosis likely causing a type B pattern on her tympanogram.  There was some mild changes in hearing at 4000 Hz on the right 6000 Hz on the left otherwise no significant change was noted.  Discrimination still  remains in a normal range although slightly reduced on the left at a lower presentation level.  She has had no difficulties with airflight and has had no problems with sinus infections since her surgery in 2022.  I explained the test findings with her I did not feel that there was fluid on the right but that hearing needs to be followed and if there is continued reduction in discrimination ability then amplification of hearing would be recommended.  I did not feel at this point did be a need for ridging studies.       Ortiz Moore DMD, MD 11/21/24 2:09 PM

## 2024-11-25 ENCOUNTER — APPOINTMENT (OUTPATIENT)
Dept: AUDIOLOGY | Facility: CLINIC | Age: 64
End: 2024-11-25
Payer: COMMERCIAL

## 2024-11-25 ENCOUNTER — APPOINTMENT (OUTPATIENT)
Dept: OTOLARYNGOLOGY | Facility: CLINIC | Age: 64
End: 2024-11-25
Payer: COMMERCIAL

## 2024-11-25 VITALS — WEIGHT: 145 LBS | BODY MASS INDEX: 28.47 KG/M2 | HEIGHT: 60 IN

## 2024-11-25 DIAGNOSIS — H74.01 TYMPANOSCLEROSIS OF RIGHT EAR: ICD-10-CM

## 2024-11-25 DIAGNOSIS — H61.21 IMPACTED CERUMEN OF RIGHT EAR: ICD-10-CM

## 2024-11-25 DIAGNOSIS — J34.2 DEVIATED NASAL SEPTUM: ICD-10-CM

## 2024-11-25 DIAGNOSIS — H69.91 EUSTACHIAN TUBE DYSFUNCTION, RIGHT: ICD-10-CM

## 2024-11-25 DIAGNOSIS — H90.6 MIXED CONDUCTIVE AND SENSORINEURAL HEARING LOSS OF BOTH EARS: Primary | ICD-10-CM

## 2024-11-25 DIAGNOSIS — J34.3 HYPERTROPHY OF NASAL TURBINATES: ICD-10-CM

## 2024-11-25 PROBLEM — N95.2 ATROPHY OF VAGINA: Status: ACTIVE | Noted: 2024-11-25

## 2024-11-25 PROBLEM — N39.41 URGE INCONTINENCE OF URINE: Status: ACTIVE | Noted: 2024-11-25

## 2024-11-25 PROCEDURE — 3008F BODY MASS INDEX DOCD: CPT | Performed by: OTOLARYNGOLOGY

## 2024-11-25 PROCEDURE — 92504 EAR MICROSCOPY EXAMINATION: CPT | Performed by: OTOLARYNGOLOGY

## 2024-11-25 PROCEDURE — 99214 OFFICE O/P EST MOD 30 MIN: CPT | Performed by: OTOLARYNGOLOGY

## 2024-11-25 PROCEDURE — 1159F MED LIST DOCD IN RCRD: CPT | Performed by: OTOLARYNGOLOGY

## 2024-11-25 PROCEDURE — 92567 TYMPANOMETRY: CPT | Performed by: AUDIOLOGIST

## 2024-11-25 PROCEDURE — 1036F TOBACCO NON-USER: CPT | Performed by: OTOLARYNGOLOGY

## 2024-11-25 PROCEDURE — 92557 COMPREHENSIVE HEARING TEST: CPT | Performed by: AUDIOLOGIST

## 2024-11-25 NOTE — PROGRESS NOTES
COMPREHENSIVE AUDIOMETRIC EVALUATION      Name:  Margarito Dumont  :  1960  Age:  64 y.o.  Date of Evaluation:  24   Referring Provider:  Ortiz Moore DMD, MD     History:  Ms. Dumont was seen today for an evaluation of hearing.  Please recall, patient has a known history of sensorineural hearing loss with high frequency asymmetry most recently evaluated 10/30/2023.  Patient reported a medical history significant for left tympanoplasty.  When asked, patient denied otalgia, tinnitus, and concerns for decreased hearing sensitivity    See audiometric evaluation at end of this report or scanned under media tab    OTOSCOPY (following cerumen removal by Dr. Moore):       Right Ear: Clear canal       Left Ear: Clear canal    226 Hz TYMPANOMETRY:       Right Ear: Type B: Flat with normal ear canal volume, consistent with middle ear involvement       Left Ear: Type Ad: hypercompliant with normal peak pressure and ear canal volume    AUDIOMETRIC EVALUATION (Phones):       Right Ear: Mild sloping to Severe, Mixed hearing loss                 Left Ear: Mild rising to WNL peak centered at 1000 Hz sloping to Moderately severe, Mixed hearing loss           NOTE: Hearing sensitivity  decreased in the right ear at 4000 Hz and the left ear at 6000 Hz as compared to most recent audiometric evaluation 10/30/2023    Test technique:  Standard Audiometry  Reliability:   good    SPEECH RECOGNITION THRESHOLD:       Right Ear:  30 dBHL in good agreement with PTA       Left Ear:  20 dBHL in good agreement with PTA    WORD RECOGNITION:       Right Ear:  100%  excellent (%) at elevated presentation level       Left Ear:   92%  excellent (%) at normal presentation level    DISCUSSION:   Discussed results and recommendations with patient.  Questions were addressed and the patient was encouraged to contact our department should concerns arise.    RECOMMENDATIONS:  -Recommend patient return following any otologic  medical management for repeated audiometric evaluation.   -Recommend patient return for hearing aid evaluation following medical clearance    Yoseph Blanca, CCC-A     Appt: 2:00 - 2:30 PM

## 2024-12-30 ENCOUNTER — HOSPITAL ENCOUNTER (OUTPATIENT)
Dept: RADIOLOGY | Facility: CLINIC | Age: 64
Discharge: HOME | End: 2024-12-30
Payer: COMMERCIAL

## 2024-12-30 DIAGNOSIS — Z13.6 SCREENING FOR HEART DISEASE: ICD-10-CM

## 2024-12-30 PROCEDURE — 75571 CT HRT W/O DYE W/CA TEST: CPT

## 2025-03-29 ENCOUNTER — OFFICE VISIT (OUTPATIENT)
Dept: URGENT CARE | Age: 65
End: 2025-03-29
Payer: COMMERCIAL

## 2025-03-29 DIAGNOSIS — J01.00 ACUTE NON-RECURRENT MAXILLARY SINUSITIS: Primary | ICD-10-CM

## 2025-03-29 PROBLEM — J01.90 ACUTE SINUSITIS: Status: RESOLVED | Noted: 2022-08-30 | Resolved: 2025-03-29

## 2025-03-29 RX ORDER — AMOXICILLIN AND CLAVULANATE POTASSIUM 875; 125 MG/1; MG/1
875 TABLET, FILM COATED ORAL 2 TIMES DAILY
Qty: 20 TABLET | Refills: 0 | Status: SHIPPED | OUTPATIENT
Start: 2025-03-29 | End: 2025-04-08

## 2025-03-29 NOTE — PATIENT INSTRUCTIONS
"Sinusitis in adults    The Basics  Written by the doctors and editors at [facility]  What is sinusitis?  This is a condition that can cause a stuffy nose, pain in the face, and discharge or \"mucus\" from the nose.  The sinuses are hollow areas in the bones of the face (figure 1). They have a thin lining that normally makes a small amount of mucus. When this lining gets irritated or infected, it swells and makes extra mucus. This causes symptoms.  Sinusitis usually happens after a person gets sick with a cold. The germs causing the cold can infect the sinuses, too. Sometimes, other germs can be the cause of the infection. Often, a person feels like their cold is getting better. But then, they get sinusitis and begin to feel sick again.  What are the symptoms of sinusitis?  Common symptoms include:  ? Stuffy or blocked nose  ? Thick white, yellow, or green discharge from the nose  ? Pain in the teeth  ? Pain or pressure in the face - This often feels worse when bending forward.  People with sinusitis can also have other symptoms, such as:  ? Fever  ? Cough  ? Trouble smelling  ? Ear pressure or fullness  ? Headache  ? Bad breath  ? Feeling tired  Most of the time, symptoms start to improve in 7 to 10 days.  How is sinusitis treated?  Most of the time, sinusitis does not need to be treated with antibiotic medicines. This is because most cases of sinusitis are caused by viruses, not bacteria, and antibiotics do not kill viruses. In fact, even sinusitis caused by bacteria usually gets better on its own without antibiotics.  Some people with sinusitis do need antibiotics. If your symptoms have not improved after 10 days, ask your doctor if you should take antibiotics. They might recommend that you wait 1 more week to see if your symptoms improve. But if you have symptoms such as a fever or a lot of pain, of if you have certain health conditions, they might prescribe antibiotics. If you do get them, follow all of your " "doctor's instructions about taking them.  What can I do on my own to feel better?  To help with your symptoms, you can:  ? Take an over-the-counter pain reliever to help with pain.  ? Rinse your nose and sinuses with salt water a few times a day - Ask your doctor or nurse about the best way to do this.  ? Drink plenty of fluids - Staying hydrated might help thin the mucus and make it drain more easily.  Your doctor might also recommend a steroid nose spray to reduce swelling in your nose, especially if you have allergies. Talk to your doctor if you are interested in this.  What if my symptoms do not get better?  Talk with your doctor or nurse. They might order tests to figure out why you still have symptoms. These can include:  ? CT scan or other imaging tests - These create pictures of the inside of the body.  ? A test to look inside the sinuses - A doctor puts a thin tube with a camera on the end into the nose and up into the sinuses.  Some people get a lot of sinus infections or have symptoms that last at least 3 months. This is a different type of sinusitis called \"chronic sinusitis.\" It can be caused by different things. For example, some people have growths inside their nose or sinuses called \"polyps.\" Other people have allergies that cause their symptoms.  Chronic sinusitis can be treated in different ways. If you have chronic sinusitis, talk with your doctor about which treatments are right for you.  When should I call the doctor?  See your doctor or nurse if your symptoms last more than 10 days, or if your symptoms first get better but then get worse.  Rarely, sinusitis can lead to serious problems. See your doctor or nurse right away (do not wait 10 days) if you have:  ? Fever higher than 102°F (38.9°C)  ? Sudden and severe pain in the face and head  ? Trouble seeing, or seeing double  ? Trouble thinking clearly  ? Swelling or redness around 1 or both eyes  ? Stiff neck  ? Trouble moving your eye normally, " or pain with eye movement

## 2025-03-29 NOTE — PROGRESS NOTES
"Urgent Care Virtual Video Visit    Patient Location: work  Provider Location: [facility]  I did see this patient virtually.  States she was changing a tire beginning of the week outside in the cold.  She was breathing the \"air, dust and fumes.\"  She then started with nasal congestion 4 to 5 days ago.  Is having pain in the maxillary sinuses that radiates to her eyes.  She blowing green mucus from her nose.  She did have sinus surgery 3 years ago where they repaired a deviated septum and she was told that her sinuses were clogged at that time.  She has probably had 4 sinus infections since then.  She does take Flonase daily.  Denies fevers, chills, photophobia, visual disturbances, chest pain, or shortness of breath.    Plan: Likely started with viral infection but there is some concern for bacterial infection.  She is having maxillary sinus pain that radiates behind her eyes and is blowing green foul-smelling mucus from her nose.  I am going to send her Augmentin.  Technically we should wait for the 10-day cindy but she has had previous sinus infection before and sinus surgeries.  I did ask that she wait 1 to 2 days and try over-the-counter therapies first.  If this does not improve then she can fill the antibiotic and take as prescribed.    Video visit completed with realtime synchronous video/audio connection. Informed consent was obtained from the patient. Patient was made aware that my evaluation and diagnosis are limited due to the fact that we are not in the same room during the interview and that this is a virtual encounter that took place via videoconferencing. Patient verbalized understanding.     Patient disposition: Home    Electronically signed by Jurgen Pendleton PA-C  9:42 AM    "

## 2025-04-16 ENCOUNTER — TELEPHONE (OUTPATIENT)
Dept: PRIMARY CARE | Facility: CLINIC | Age: 65
End: 2025-04-16
Payer: COMMERCIAL

## 2025-04-17 DIAGNOSIS — F32.A DEPRESSION, UNSPECIFIED DEPRESSION TYPE: ICD-10-CM

## 2025-04-17 RX ORDER — VENLAFAXINE HYDROCHLORIDE 75 MG/1
225 TABLET, EXTENDED RELEASE ORAL DAILY
Qty: 90 TABLET | Refills: 11 | Status: SHIPPED | OUTPATIENT
Start: 2025-04-17 | End: 2026-04-12

## 2025-07-23 ENCOUNTER — APPOINTMENT (OUTPATIENT)
Dept: ORTHOPEDIC SURGERY | Age: 65
End: 2025-07-23
Payer: MEDICARE

## 2025-07-23 DIAGNOSIS — S83.241A ACUTE MEDIAL MENISCUS TEAR OF RIGHT KNEE, INITIAL ENCOUNTER: Primary | ICD-10-CM

## 2025-07-23 DIAGNOSIS — M25.561 RIGHT KNEE PAIN, UNSPECIFIED CHRONICITY: ICD-10-CM

## 2025-07-23 PROCEDURE — 1160F RVW MEDS BY RX/DR IN RCRD: CPT | Performed by: STUDENT IN AN ORGANIZED HEALTH CARE EDUCATION/TRAINING PROGRAM

## 2025-07-23 PROCEDURE — 99204 OFFICE O/P NEW MOD 45 MIN: CPT | Performed by: STUDENT IN AN ORGANIZED HEALTH CARE EDUCATION/TRAINING PROGRAM

## 2025-07-23 PROCEDURE — 1125F AMNT PAIN NOTED PAIN PRSNT: CPT | Performed by: STUDENT IN AN ORGANIZED HEALTH CARE EDUCATION/TRAINING PROGRAM

## 2025-07-23 PROCEDURE — 1159F MED LIST DOCD IN RCRD: CPT | Performed by: STUDENT IN AN ORGANIZED HEALTH CARE EDUCATION/TRAINING PROGRAM

## 2025-07-23 ASSESSMENT — PAIN SCALES - GENERAL: PAINLEVEL_OUTOF10: 6

## 2025-07-23 ASSESSMENT — PAIN - FUNCTIONAL ASSESSMENT: PAIN_FUNCTIONAL_ASSESSMENT: 0-10

## 2025-07-23 NOTE — PROGRESS NOTES
PRIMARY CARE PHYSICIAN: Katherin Sharpe, APRN-CNP  REFERRING PROVIDER: No referring provider defined for this encounter.     CONSULT ORTHOPAEDIC: Knee Evaluation    ASSESSMENT & PLAN    Impression: 64 y.o. female with right knee injury concerning for a medial mensicus tear    Plan:   I explained to the patient the nature of their diagnosis.  I reviewed their imaging studies with them.    Based on the history, physical exam and imaging studies above, the patient's presentation is consistent with the above diagnosis.  I had a long discussion with the patient regarding their presentation and the treatment options.  We discussed initial nonoperative versus operative management options as well as potential further diagnostic imaging.  Patient had an acute mechanism of injury, symptoms and physical exam findings concerning for a medial meniscus tear of the right knee.  Her pain and dysfunction are affecting her activities of daily living. At this time I recommend proceeding with further diagnostic imaging with an MRI of the right knee. The MRI is medically necessary and indicated given their subjective complaints of instability and physical exam findings as described below . The MRI will be used for potential presurgical planning purposes. The MRI should be performed in a hospital setting so the surgeon has immediate access to the images.     Follow-Up: Patient will follow-up once MRI is completed the images and discuss the treatment plan going forward    At the end of the visit, all questions were answered in full. The patient is in agreement with the plan and recommendations. They will call the office with any questions/concerns.    Note dictated with [employer] software. Completed without full typed error editing and sent to avoid delay.       SUBJECTIVE  CHIEF COMPLAINT:   Chief Complaint   Patient presents with    Right Knee - Pain        HPI: Margarito Dumont is a 64 y.o. female who presents today  "with right knee pain, XR performed at  today. Pain began approximately 2 weeks ago when Margarito was walking up the basement steps carrying a tub when she felt a sudden \"pop,\" followed by significant pain in the right knee. Since then, she has been experiencing knee pain that she rates 6/10, on average. Margarito is struggling to walk up stairs or squat due to knee pain. It is localized to the medial aspect. She complains of mechanical symptoms including popping, catching and locking of the knee. She has been taking Naproxen for her knee pain without much relief. She feels her pain and dysfunction are worsening since the injury. Margarito denies any past history of right knee injury.    They deny any constant or progressive numbness or tingling in their legs.     REVIEW OF SYSTEMS  Constitutional: See HPI for pain assessment, No significant weight loss, recent trauma  Cardiovascular: No chest pain, shortness of breath  Respiratory: No difficulty breathing, cough  Gastrointestinal: No nausea, vomiting, diarrhea, constipation  Musculoskeletal: Noted in HPI, positive for pain, restricted motion, stiffness  Integumentary: No rashes, easy bruising, redness   Neurological: no numbness or tingling in extremities, no gait disturbances   Psychiatric: No mood changes, memory changes, social issues  Heme/Lymph: no excessive swelling, easy bruising, excessive bleeding  ENT: No hearing changes  Eyes: No vision changes    Medical History[1]     Allergies[2]     Surgical History[3]     Family History[4]     Social History     Socioeconomic History    Marital status:      Spouse name: Adi    Number of children: Not on file    Years of education: Not on file    Highest education level: Not on file   Occupational History    Not on file   Tobacco Use    Smoking status: Former     Types: Cigarettes    Smokeless tobacco: Never   Vaping Use    Vaping status: Never Used   Substance and Sexual Activity    Alcohol use: Not Currently     " Comment: social    Drug use: Never    Sexual activity: Not on file   Other Topics Concern    Not on file   Social History Narrative    Not on file     Social Drivers of Health     Financial Resource Strain: Not on file   Food Insecurity: Not on file   Transportation Needs: Not on file   Physical Activity: Not on file   Stress: Not on file   Social Connections: Not on file   Intimate Partner Violence: Not on file   Housing Stability: Not on file        CURRENT MEDICATIONS:   Current Medications[5]     OBJECTIVE    PHYSICAL EXAM      1/18/2024    11:56 AM 3/13/2024     9:37 AM 4/17/2024    10:06 AM 8/19/2024    10:20 AM 9/9/2024     9:50 AM 11/4/2024     9:09 AM 11/25/2024     2:17 PM   Vitals   Systolic     124     Diastolic     68     BP Location     Right arm     Heart Rate     70     Height 1.524 m (5') 1.524 m (5') 1.524 m (5') 1.524 m (5') 1.524 m (5') 1.524 m (5') 1.524 m (5')   Weight (lb) 140 140 140 140 147 143 145   BMI 27.34 kg/m2 27.34 kg/m2 27.34 kg/m2 27.34 kg/m2 28.71 kg/m2 27.93 kg/m2 28.32 kg/m2   BSA (m2) 1.64 m2 1.64 m2 1.64 m2 1.64 m2 1.68 m2 1.66 m2 1.67 m2   Visit Report Report Report Report Report Report  Report      There is no height or weight on file to calculate BMI.    GENERAL: A/Ox3, NAD. Appears healthy, well nourished  PSYCHIATRIC: Mood stable, appropriate memory recall  EYES: EOM intact, no scleral icterus  CARDIOVASCULAR: Palpable peripheral pulses  LUNGS: Breathing non-labored on room air  SKIN: no erythema, rashes, or ecchymoses     MUSCULOSKELETAL:  Laterality: right Knee Exam  - Skin intact  - No erythema or warmth  - No ecchymosis or soft tissue swelling  - Alignment: neutral  - Palpation: positive TTP medial and lateral joint line  - ROM: 0-0-120  - Effusion: small  - Strength: knee extension and flexion 5/5, EHL/PF/DF motor intact  - Stability:        Anterior drawer stable       Posterior drawer stable       Varus/valgus stable       negative Lachman  - positive Neptali's  -  Gait: antalgic  - Hip Exam: flexion to 100+ degrees, full extension, internal/external rotation adequate, and no pain with log roll    NEUROVASCULAR:  - Neurovascular Status: sensation intact to light touch distally, lower extremity motor intact  - Capillary refill brisk at extremities, Bilateral dorsalis pedis pulse 2+    Imaging: Multiple views of the affected right knee(s) demonstrate: no acute osseous abnormality, no fracture, minimal degenerative changes.   X-rays were personally reviewed and interpreted by me.  Radiology reports were reviewed by me as well, if readily available at the time.      Khanh Becerril MD  Attending Surgeon    Sports Medicine Orthopaedic Surgery  [facility] [employer]          [1]   Past Medical History:  Diagnosis Date    Abnormal TSH     Acute lower UTI     Acute otitis externa     Acute recurrent pansinusitis     Acute sinus infection     At risk of diabetes mellitus     Atypical ductal hyperplasia, breast     Bad odor of urine     Chronic rhinitis     Class 1 obesity with body mass index (BMI) of 30.0 to 30.9 in adult     Depression     Deviated nasal septum     Diarrhea     Dysuria     Elevated LDL cholesterol level     Erosive (osteo)arthritis     Excessive cerumen in right ear canal     Fatigue     Flank pain     HTN (hypertension)     HTN (hypertension)     Hyperlipidemia     Hyperlipidemia     Hypertrophy of inferior nasal turbinate     Left ear pain     Left wrist pain     Leukopenia     Localized primary osteoarthritis of first carpometacarpal joint of left wrist     Nasal crusting     Non-smoker     Non-smoker     Osteopenia     Personal history of other drug therapy     COVID-19 vaccine series completed    Poison ivy dermatitis     Polyarthralgia     Post-nasal drainage     Pre-diabetes     Rheumatoid arthritis     Seborrheic keratoses     Tendinitis of hand      Unspecified abnormal findings in urine 12/07/2020    Bad odor of urine    Urinary frequency     UTI (urinary tract infection)     Vitamin D deficiency    [2]   Allergies  Allergen Reactions    Hydrocodone-Acetaminophen Anaphylaxis and Swelling    Oxycodone-Acetaminophen Diarrhea, Hallucinations, Hives, Unknown and GI Upset     Dizziness    Other Other    Adhesive Unknown    Sulfa (Sulfonamide Antibiotics) Hives    Tyloxapol Unknown    Adhesive Tape-Silicones Rash    Latex, Natural Rubber Rash    Tapentadol Rash   [3]   Past Surgical History:  Procedure Laterality Date    BREAST BIOPSY Left     atypia    HYSTERECTOMY      OTHER SURGICAL HISTORY  02/20/2020    Cosmetic surgery    OTHER SURGICAL HISTORY  02/20/2020    Hysterectomy    OTHER SURGICAL HISTORY  10/28/2022    Sinus surgery   [4]   Family History  Problem Relation Name Age of Onset    Alzheimer's disease Mother      Throat cancer Mother      Liver cancer Mother      Throat cancer Father      Liver cancer Father      Breast cancer Sister      Alzheimer's disease Other      Cancer Other      Throat cancer Other      Liver cancer Other     [5]   Current Outpatient Medications   Medication Sig Dispense Refill    acetaminophen (Tylenol) 500 mg tablet Take 2 tablets (1,000 mg) by mouth every 8 hours.      alendronate (Fosamax) 70 mg tablet TAKE 1 TABLET BY MOUTH ONE TIME A WEEK IN THE MORNING WITH A GLASS OF WATER ON EMPTY STOMACH DO NOT TAKE ANYTHING ELSE OR LIE DOWN FOR 30 MINUTES      cholecalciferol (Vitamin D-3) 50 mcg (2,000 unit) capsule Take by mouth.      fluticasone (Flonase) 50 mcg/actuation nasal spray Administer 2 sprays into each nostril once daily. 16 g 11    folic acid (Folvite) 1 mg tablet Take 1 tablet (1 mg) by mouth once daily. 90 tablet 3    ibuprofen 800 mg tablet       L. acidophilus/Bifid. animalis 15.5 billion cell capsule Take by mouth.      lisinopril 30 mg tablet Take 1 tablet (30 mg) by mouth once daily. 90 tablet 3    methotrexate  (Trexall) 2.5 mg tablet Take 8 tablets (20 mg total) by mouth once a week.      venlafaxine 75 mg 24 hr tablet Take 3 tablets (225 mg) by mouth once daily. 90 tablet 11    verapamil SR (Calan SR) 180 mg ER tablet Take 1 tablet (180 mg) by mouth once daily. 90 tablet 3     No current facility-administered medications for this visit.

## 2025-08-18 ENCOUNTER — HOSPITAL ENCOUNTER (OUTPATIENT)
Dept: RADIOLOGY | Facility: CLINIC | Age: 65
Discharge: HOME | End: 2025-08-18
Payer: MEDICARE

## 2025-08-18 ENCOUNTER — TELEPHONE (OUTPATIENT)
Dept: PRIMARY CARE | Facility: CLINIC | Age: 65
End: 2025-08-18
Payer: MEDICARE

## 2025-08-18 DIAGNOSIS — S83.241A ACUTE MEDIAL MENISCUS TEAR OF RIGHT KNEE, INITIAL ENCOUNTER: ICD-10-CM

## 2025-08-18 DIAGNOSIS — F32.A DEPRESSION, UNSPECIFIED DEPRESSION TYPE: Primary | ICD-10-CM

## 2025-08-18 PROCEDURE — 73721 MRI JNT OF LWR EXTRE W/O DYE: CPT | Mod: RT

## 2025-08-18 PROCEDURE — 73721 MRI JNT OF LWR EXTRE W/O DYE: CPT | Mod: RIGHT SIDE | Performed by: STUDENT IN AN ORGANIZED HEALTH CARE EDUCATION/TRAINING PROGRAM

## 2025-08-19 RX ORDER — VENLAFAXINE 75 MG/1
75 TABLET ORAL 2 TIMES DAILY
Qty: 180 TABLET | Refills: 2 | Status: SHIPPED | OUTPATIENT
Start: 2025-08-19

## 2025-08-20 ENCOUNTER — APPOINTMENT (OUTPATIENT)
Dept: ORTHOPEDIC SURGERY | Age: 65
End: 2025-08-20
Payer: MEDICARE

## 2025-08-20 VITALS — WEIGHT: 145 LBS | BODY MASS INDEX: 28.47 KG/M2 | HEIGHT: 60 IN

## 2025-08-20 DIAGNOSIS — S83.241A ACUTE MEDIAL MENISCUS TEAR OF RIGHT KNEE, INITIAL ENCOUNTER: ICD-10-CM

## 2025-08-20 DIAGNOSIS — M17.11 PRIMARY OSTEOARTHRITIS OF ONE KNEE, RIGHT: Primary | ICD-10-CM

## 2025-08-20 DIAGNOSIS — M25.561 RIGHT KNEE PAIN, UNSPECIFIED CHRONICITY: ICD-10-CM

## 2025-08-20 PROCEDURE — 1159F MED LIST DOCD IN RCRD: CPT | Performed by: STUDENT IN AN ORGANIZED HEALTH CARE EDUCATION/TRAINING PROGRAM

## 2025-08-20 PROCEDURE — 3008F BODY MASS INDEX DOCD: CPT | Performed by: STUDENT IN AN ORGANIZED HEALTH CARE EDUCATION/TRAINING PROGRAM

## 2025-08-20 PROCEDURE — 20610 DRAIN/INJ JOINT/BURSA W/O US: CPT | Performed by: STUDENT IN AN ORGANIZED HEALTH CARE EDUCATION/TRAINING PROGRAM

## 2025-08-20 PROCEDURE — 99214 OFFICE O/P EST MOD 30 MIN: CPT | Performed by: STUDENT IN AN ORGANIZED HEALTH CARE EDUCATION/TRAINING PROGRAM

## 2025-08-20 PROCEDURE — 1125F AMNT PAIN NOTED PAIN PRSNT: CPT | Performed by: STUDENT IN AN ORGANIZED HEALTH CARE EDUCATION/TRAINING PROGRAM

## 2025-08-20 PROCEDURE — 1160F RVW MEDS BY RX/DR IN RCRD: CPT | Performed by: STUDENT IN AN ORGANIZED HEALTH CARE EDUCATION/TRAINING PROGRAM

## 2025-08-20 RX ADMIN — LIDOCAINE HYDROCHLORIDE 2 ML: 10 INJECTION, SOLUTION INFILTRATION; PERINEURAL at 11:00

## 2025-08-20 RX ADMIN — TRIAMCINOLONE ACETONIDE 40 MG: 40 INJECTION, SUSPENSION INTRA-ARTICULAR; INTRAMUSCULAR at 11:00

## 2025-08-20 ASSESSMENT — PAIN DESCRIPTION - DESCRIPTORS: DESCRIPTORS: THROBBING

## 2025-08-20 ASSESSMENT — PAIN - FUNCTIONAL ASSESSMENT: PAIN_FUNCTIONAL_ASSESSMENT: 0-10

## 2025-08-20 ASSESSMENT — PAIN SCALES - GENERAL: PAINLEVEL_OUTOF10: 7

## 2025-08-26 RX ORDER — LIDOCAINE HYDROCHLORIDE 10 MG/ML
2 INJECTION, SOLUTION INFILTRATION; PERINEURAL
Status: COMPLETED | OUTPATIENT
Start: 2025-08-20 | End: 2025-08-20

## 2025-08-26 RX ORDER — TRIAMCINOLONE ACETONIDE 40 MG/ML
40 INJECTION, SUSPENSION INTRA-ARTICULAR; INTRAMUSCULAR
Status: COMPLETED | OUTPATIENT
Start: 2025-08-20 | End: 2025-08-20

## 2025-09-10 ENCOUNTER — APPOINTMENT (OUTPATIENT)
Dept: PRIMARY CARE | Facility: CLINIC | Age: 65
End: 2025-09-10
Payer: COMMERCIAL